# Patient Record
Sex: MALE | Race: WHITE | NOT HISPANIC OR LATINO | Employment: FULL TIME | URBAN - METROPOLITAN AREA
[De-identification: names, ages, dates, MRNs, and addresses within clinical notes are randomized per-mention and may not be internally consistent; named-entity substitution may affect disease eponyms.]

---

## 2017-01-27 ENCOUNTER — ALLSCRIPTS OFFICE VISIT (OUTPATIENT)
Dept: OTHER | Facility: OTHER | Age: 47
End: 2017-01-27

## 2017-01-27 DIAGNOSIS — E78.5 HYPERLIPIDEMIA: ICD-10-CM

## 2017-01-27 DIAGNOSIS — M25.561 PAIN IN RIGHT KNEE: ICD-10-CM

## 2017-01-27 DIAGNOSIS — M25.562 PAIN IN LEFT KNEE: ICD-10-CM

## 2018-01-14 VITALS
HEIGHT: 63 IN | WEIGHT: 169.2 LBS | OXYGEN SATURATION: 99 % | BODY MASS INDEX: 29.98 KG/M2 | TEMPERATURE: 97.7 F | SYSTOLIC BLOOD PRESSURE: 114 MMHG | DIASTOLIC BLOOD PRESSURE: 72 MMHG | HEART RATE: 98 BPM

## 2019-12-27 ENCOUNTER — APPOINTMENT (EMERGENCY)
Dept: CT IMAGING | Facility: HOSPITAL | Age: 49
End: 2019-12-27
Payer: COMMERCIAL

## 2019-12-27 ENCOUNTER — HOSPITAL ENCOUNTER (OUTPATIENT)
Facility: HOSPITAL | Age: 49
Setting detail: OBSERVATION
Discharge: HOME | End: 2019-12-29
Attending: EMERGENCY MEDICINE | Admitting: INTERNAL MEDICINE
Payer: COMMERCIAL

## 2019-12-27 ENCOUNTER — APPOINTMENT (EMERGENCY)
Dept: ULTRASOUND IMAGING | Facility: HOSPITAL | Age: 49
End: 2019-12-27
Payer: COMMERCIAL

## 2019-12-27 ENCOUNTER — APPOINTMENT (OUTPATIENT)
Dept: MRI IMAGING | Facility: HOSPITAL | Age: 49
End: 2019-12-27
Payer: COMMERCIAL

## 2019-12-27 DIAGNOSIS — T14.8XXA INTRAMUSCULAR HEMATOMA: Primary | ICD-10-CM

## 2019-12-27 DIAGNOSIS — S80.12XA HEMATOMA OF LEFT LOWER EXTREMITY: ICD-10-CM

## 2019-12-27 LAB
ALBUMIN SERPL BCP-MCNC: 3.8 G/DL (ref 3.5–5)
ALP SERPL-CCNC: 61 U/L (ref 46–116)
ALT SERPL W P-5'-P-CCNC: 28 U/L (ref 12–78)
ANION GAP SERPL CALCULATED.3IONS-SCNC: 10 MMOL/L (ref 4–13)
AST SERPL W P-5'-P-CCNC: 42 U/L (ref 5–45)
BASOPHILS # BLD AUTO: 0.02 THOUSANDS/ΜL (ref 0–0.1)
BASOPHILS NFR BLD AUTO: 0 % (ref 0–1)
BILIRUB DIRECT SERPL-MCNC: 0.12 MG/DL (ref 0–0.2)
BILIRUB SERPL-MCNC: 0.7 MG/DL (ref 0.2–1)
BUN SERPL-MCNC: 19 MG/DL (ref 5–25)
CALCIUM SERPL-MCNC: 8.4 MG/DL (ref 8.3–10.1)
CHLORIDE SERPL-SCNC: 103 MMOL/L (ref 100–108)
CO2 SERPL-SCNC: 26 MMOL/L (ref 21–32)
CREAT SERPL-MCNC: 0.74 MG/DL (ref 0.6–1.3)
EOSINOPHIL # BLD AUTO: 0.19 THOUSAND/ΜL (ref 0–0.61)
EOSINOPHIL NFR BLD AUTO: 2 % (ref 0–6)
ERYTHROCYTE [DISTWIDTH] IN BLOOD BY AUTOMATED COUNT: 12.4 % (ref 11.6–15.1)
GFR SERPL CREATININE-BSD FRML MDRD: 110 ML/MIN/1.73SQ M
GLUCOSE SERPL-MCNC: 109 MG/DL (ref 65–140)
HCT VFR BLD AUTO: 35.8 % (ref 36.5–49.3)
HGB BLD-MCNC: 12.4 G/DL (ref 12–17)
IMM GRANULOCYTES # BLD AUTO: 0.02 THOUSAND/UL (ref 0–0.2)
IMM GRANULOCYTES NFR BLD AUTO: 0 % (ref 0–2)
INR PPP: 0.92 (ref 0.84–1.19)
LYMPHOCYTES # BLD AUTO: 2.38 THOUSANDS/ΜL (ref 0.6–4.47)
LYMPHOCYTES NFR BLD AUTO: 30 % (ref 14–44)
MCH RBC QN AUTO: 31.3 PG (ref 26.8–34.3)
MCHC RBC AUTO-ENTMCNC: 34.6 G/DL (ref 31.4–37.4)
MCV RBC AUTO: 90 FL (ref 82–98)
MONOCYTES # BLD AUTO: 0.7 THOUSAND/ΜL (ref 0.17–1.22)
MONOCYTES NFR BLD AUTO: 9 % (ref 4–12)
NEUTROPHILS # BLD AUTO: 4.59 THOUSANDS/ΜL (ref 1.85–7.62)
NEUTS SEG NFR BLD AUTO: 59 % (ref 43–75)
NRBC BLD AUTO-RTO: 0 /100 WBCS
PLATELET # BLD AUTO: 270 THOUSANDS/UL (ref 149–390)
PMV BLD AUTO: 9.5 FL (ref 8.9–12.7)
POTASSIUM SERPL-SCNC: 3.6 MMOL/L (ref 3.5–5.3)
PROT SERPL-MCNC: 6.9 G/DL (ref 6.4–8.2)
PROTHROMBIN TIME: 12.4 SECONDS (ref 11.6–14.5)
RBC # BLD AUTO: 3.96 MILLION/UL (ref 3.88–5.62)
SODIUM SERPL-SCNC: 139 MMOL/L (ref 136–145)
WBC # BLD AUTO: 7.9 THOUSAND/UL (ref 4.31–10.16)

## 2019-12-27 PROCEDURE — 99285 EMERGENCY DEPT VISIT HI MDM: CPT | Performed by: EMERGENCY MEDICINE

## 2019-12-27 PROCEDURE — 36415 COLL VENOUS BLD VENIPUNCTURE: CPT | Performed by: EMERGENCY MEDICINE

## 2019-12-27 PROCEDURE — 93971 EXTREMITY STUDY: CPT | Performed by: SURGERY

## 2019-12-27 PROCEDURE — 73701 CT LOWER EXTREMITY W/DYE: CPT

## 2019-12-27 PROCEDURE — 99284 EMERGENCY DEPT VISIT MOD MDM: CPT

## 2019-12-27 PROCEDURE — 80048 BASIC METABOLIC PNL TOTAL CA: CPT | Performed by: EMERGENCY MEDICINE

## 2019-12-27 PROCEDURE — 93971 EXTREMITY STUDY: CPT

## 2019-12-27 PROCEDURE — 85610 PROTHROMBIN TIME: CPT | Performed by: EMERGENCY MEDICINE

## 2019-12-27 PROCEDURE — 73718 MRI LOWER EXTREMITY W/O DYE: CPT

## 2019-12-27 PROCEDURE — 85025 COMPLETE CBC W/AUTO DIFF WBC: CPT | Performed by: EMERGENCY MEDICINE

## 2019-12-27 PROCEDURE — 80076 HEPATIC FUNCTION PANEL: CPT | Performed by: EMERGENCY MEDICINE

## 2019-12-27 PROCEDURE — 99244 OFF/OP CNSLTJ NEW/EST MOD 40: CPT | Performed by: PHYSICIAN ASSISTANT

## 2019-12-27 PROCEDURE — 99219 PR INITIAL OBSERVATION CARE/DAY 50 MINUTES: CPT | Performed by: INTERNAL MEDICINE

## 2019-12-27 RX ORDER — TRAMADOL HYDROCHLORIDE 50 MG/1
50 TABLET ORAL EVERY 6 HOURS PRN
Status: DISCONTINUED | OUTPATIENT
Start: 2019-12-27 | End: 2019-12-29 | Stop reason: HOSPADM

## 2019-12-27 RX ORDER — ACETAMINOPHEN 325 MG/1
650 TABLET ORAL EVERY 6 HOURS PRN
Status: DISCONTINUED | OUTPATIENT
Start: 2019-12-27 | End: 2019-12-29 | Stop reason: HOSPADM

## 2019-12-27 RX ORDER — OXYCODONE HYDROCHLORIDE 5 MG/1
5 TABLET ORAL EVERY 4 HOURS PRN
Status: DISCONTINUED | OUTPATIENT
Start: 2019-12-27 | End: 2019-12-29 | Stop reason: HOSPADM

## 2019-12-27 RX ADMIN — IOHEXOL 100 ML: 350 INJECTION, SOLUTION INTRAVENOUS at 11:16

## 2019-12-27 RX ADMIN — TRAMADOL HYDROCHLORIDE 50 MG: 50 TABLET, FILM COATED ORAL at 17:18

## 2019-12-27 NOTE — ED NOTES
1  CC - pt seen ED for acute lower left extremity swelling, no injury noted, pt reports feeling a "tearing sensation" and noticed swelling  VAS duplex completed, along with CT of tib/fib  Pt diagnosed with intramuscular hematoma  MRI ordered, awaiting time  Pt evaluated via ortho - ortho plan to await MRI and continue to track progress of swelling to extremity  2  Admission related to injury? - no    3  Orientation status - A&Ox4    4  Abnormal labs/abnormal focused assessment/vitals - none    5  Medications/drips - none infusing    6  Last time narcotics given - Ultram given at 1718    7  IV lines/drains/etc - 20g L AC    8  Isolation status - standard    9  Skin - intact     10  Ambulation - independent     11   ED nurse's name/# - Catherine Laura RN  12/27/19 5814

## 2019-12-27 NOTE — LETTER
[address] [place] [phone]  No information on file  December 29, 2019     Patient: Aries Mendoza   YOB: 1970   Date of Visit: 12/27/2019       To Whom it May Concern:    Allie Lopez is under my professional care  He was seen in the hospital from 12/27/2019   to 12/29/19  He may return to work on 01/07/19 with the following limitations partial weight bearing left lower extremity, use crutches until cleared by your ortho doctor  If you have any questions or concerns, please don't hesitate to call           Sincerely,          Destin Sharp MD

## 2019-12-27 NOTE — ASSESSMENT & PLAN NOTE
· Place in 23-hour observation, med/surg level of care  · Serial examinations to monitor the patient's pulses in the left lower extremity  · Serial laboratory testing to monitor the patient's hemoglobin and hematocrit levels  · Consult Orthopedic Surgery  · Check an MRI of the left tibia/fibula region      CT tibia fibula left w contrast   Status: Final result   PACS Images      Show images for CT tibia fibula left w contrast   Study Result     CT LEFT TIBIA-FIBULA WITH IV CONTRAST     INDICATION: spontaneous hemorrhage      COMPARISON: None      TECHNIQUE: CT examination of the left tibia-fibula was performed  This examination, like all CT scans performed in the [facility], was performed utilizing techniques to minimize radiation dose exposure, including the use of iterative   reconstruction and automated exposure control software  Sagittal and coronal two dimensional reconstructed images were also submitted for interpretation      IV Contrast: 100 mL of iohexol (OMNIPAQUE)     Rad dose  513 mGy-cm      FINDINGS:     OSSEOUS STRUCTURES:  No fracture, dislocation or destructive osseous lesion      VISUALIZED MUSCULATURE AND SOFT TISSUES:  There is fluid and hyperdense hematoma in the fascial plane between the distal gastrocnemius medial head and soleus muscles with edema tracking throughout the subcutaneous tissues of the posteromedial leg  The   hematoma measures approximately 7 4 x 1 1 x 8 cm (transverse by AP by proximodistal), although the margins are ill-defined  No evidence of active extravasation  No gas in the deep fascial planes  Normal three-vessel runoff to the level of the foot      OTHER PERTINENT FINDINGS:  None      IMPRESSION:     1  Intermuscular hematoma at the posteromedial aspect of the left mid leg, possibly due to a traumatic contusion versus regional muscular or tendinous injury, although no discrete injury is identified    No evidence of active arterial extravasation      2  No acute osseous abnormality         VAS lower limb venous duplex study, unilateral/limited   Status: Final result   PACS Images      Show images for VAS lower limb venous duplex study, unilateral/limited   Study Result        [facility] REPORT  CLINICAL:  Indications: Left Localized edema [R60 0]  Patient states he got a left calf  cramp yesterday and then the calf swelled and bruised  Patient denies recent  travel  Risk Factors  The patient has no history of DVT or Recent Trauma  FINDINGS:     Segment  Right            Left                Impression       Impression         CFV      Normal (Patent)  Normal (Patent)             CONCLUSION:     Impression:  RIGHT LOWER LIMB LIMITED:  Evaluation shows no evidence of thrombus in the common femoral vein  Doppler evaluation shows a normal response to augmentation maneuvers  LEFT LOWER LIMB:  No evidence of acute or chronic deep vein thrombosis  No evidence of superficial thrombophlebitis noted  Doppler evaluation shows a normal response to augmentation maneuvers  Popliteal, posterior tibial and anterior tibial arterial Doppler waveforms are  triphasic  There is intramuscular fluid seen in the left calf in the area of swelling and  bruising  Technical findings were given to Dr Lissette Hopkins at time of study       SIGNATURE:  Electronically Signed by: Mindy Denton on 2019-12-27 11:51:00 AM

## 2019-12-27 NOTE — H&P
H&P- Lorenzokristian Retort 1970, 54 y o  male MRN: [id number]    Unit/Bed#: ED 24 Encounter: [id number]    Primary Care Provider: Elizabeth Medina MD   Date and time admitted to hospital: 12/27/2019  9:24 AM        * Hematoma of left lower extremity  Assessment & Plan  · Place in 23-hour observation, med/surg level of care  · Serial examinations to monitor the patient's pulses in the left lower extremity  · Serial laboratory testing to monitor the patient's hemoglobin and hematocrit levels  · Consult Orthopedic Surgery  · Check an MRI of the left tibia/fibula region      CT tibia fibula left w contrast   Status: Final result   PACS Images      Show images for CT tibia fibula left w contrast   Study Result     CT LEFT TIBIA-FIBULA WITH IV CONTRAST     INDICATION: spontaneous hemorrhage      COMPARISON: None      TECHNIQUE: CT examination of the left tibia-fibula was performed  This examination, like all CT scans performed in the [facility], was performed utilizing techniques to minimize radiation dose exposure, including the use of iterative   reconstruction and automated exposure control software  Sagittal and coronal two dimensional reconstructed images were also submitted for interpretation      IV Contrast: 100 mL of iohexol (OMNIPAQUE)     Rad dose  513 mGy-cm      FINDINGS:     OSSEOUS STRUCTURES:  No fracture, dislocation or destructive osseous lesion      VISUALIZED MUSCULATURE AND SOFT TISSUES:  There is fluid and hyperdense hematoma in the fascial plane between the distal gastrocnemius medial head and soleus muscles with edema tracking throughout the subcutaneous tissues of the posteromedial leg  The   hematoma measures approximately 7 4 x 1 1 x 8 cm (transverse by AP by proximodistal), although the margins are ill-defined  No evidence of active extravasation  No gas in the deep fascial planes    Normal three-vessel runoff to the level of the foot      OTHER PERTINENT FINDINGS: None      IMPRESSION:     1  Intermuscular hematoma at the posteromedial aspect of the left mid leg, possibly due to a traumatic contusion versus regional muscular or tendinous injury, although no discrete injury is identified  No evidence of active arterial extravasation      2  No acute osseous abnormality         VAS lower limb venous duplex study, unilateral/limited   Status: Final result   PACS Images      Show images for VAS lower limb venous duplex study, unilateral/limited   Study Result        [facility] REPORT  CLINICAL:  Indications: Left Localized edema [R60 0]  Patient states he got a left calf  cramp yesterday and then the calf swelled and bruised  Patient denies recent  travel  Risk Factors  The patient has no history of DVT or Recent Trauma  FINDINGS:     Segment  Right            Left                Impression       Impression         CFV      Normal (Patent)  Normal (Patent)             CONCLUSION:     Impression:  RIGHT LOWER LIMB LIMITED:  Evaluation shows no evidence of thrombus in the common femoral vein  Doppler evaluation shows a normal response to augmentation maneuvers  LEFT LOWER LIMB:  No evidence of acute or chronic deep vein thrombosis  No evidence of superficial thrombophlebitis noted  Doppler evaluation shows a normal response to augmentation maneuvers  Popliteal, posterior tibial and anterior tibial arterial Doppler waveforms are  triphasic  There is intramuscular fluid seen in the left calf in the area of swelling and  bruising  Technical findings were given to Dr Anthony Cha at time of study  SIGNATURE:  Electronically Signed by: Kellie Bagley on 2019-12-27 11:51:00 AM           VTE Prophylaxis: Pharmacologic VTE Prophylaxis contraindicated due to Intermuscular hematoma of the left lower extremity  / sequential compression device on the right lower extremity only    No SCD on the left lower extremity with an intermuscular hematoma of the left lower extremity  Code Status: Level 1-Full Code    Anticipated Length of Stay:  Patient will be admitted on an Observation basis with an anticipated length of stay of less than 2 midnights  Justification for Hospital Stay:  The patient requires an MRI of the left tibia/fibula region, an Orthopedic Surgery consultation, serial examinations of the pulses of the left lower extremity, and serial laboratory testing to monitor the hemoglobin and hematocrit levels  Chief Complaint:  Pain and ecchymosis of the left lower extremity    History of Present Illness:    Elisabeth Carney is a 54 y o  male who presents to emergency department with the complaint of pain and ecchymosis of the left lower extremity  Over the last week, the patient has been snowboarding as well as mountain biking, but he did not sustain any injury that he was aware of experiencing  The patient was walking on Thursday, 12/26/2019, and developed severe left calf pain  The patient then developed ecchymosis and edema involving the left calf region  The patient is having difficulty with ambulation secondary to the left calf pain  The patient denies any trauma to the left lower extremity  No chest pain  No shortness of breath  No fevers or chills  Review of Systems:    Review of Systems:  Per HPI, all other systems have been reviewed and were negative  Past Medical and Surgical History:     Past Medical History:   Diagnosis Date    Seasonal allergies        History reviewed  No pertinent surgical history  Meds/Allergies:    Prior to Admission medications    Not on File     I have reviewed home medications with patient personally      Allergies: No Known Allergies    Social History:     Marital Status: /Civil Union     Substance Use History:   Social History     Substance and Sexual Activity   Alcohol Use Yes    Comment: socially     Social History     Tobacco Use   Smoking Status Never Smoker   Smokeless Tobacco Never Used Social History     Substance and Sexual Activity   Drug Use Never       Family History:    non-contributory    Physical Exam:     Vitals:   Blood Pressure: 138/63 (12/27/19 1500)  Pulse: 77 (12/27/19 1500)  Temperature: 97 9 °F (36 6 °C) (12/27/19 1142)  Temp Source: Oral (12/27/19 1142)  Respirations: 20 (12/27/19 1500)  Height: 5' 3" (160 cm) (12/27/19 0926)  Weight - Scale: 82 5 kg (181 lb 14 1 oz) (12/27/19 0926)  SpO2: 96 % (12/27/19 1500)    Physical Exam  General:  NAD, awake, alert, follows commands  HEENT:  NC/AT, mucous membranes moist  Neck:  Supple, No JVP elevation  CV:  + S1, + S2, RRR  Pulm:  Lung fields are CTA bilaterally  Abd:  Soft, Non-tender, Non-distended  Ext:  No clubbing/cyanosis, + Edema of the left calf region  Skin:  + Ecchymosis of the left calf region and streaking erythema of the left anterior shin region      Additional Data:     Lab Results: I have personally reviewed pertinent reports  Results from last 7 days   Lab Units 12/27/19  1028   WBC Thousand/uL 7 90   HEMOGLOBIN g/dL 12 4   HEMATOCRIT % 35 8*   PLATELETS Thousands/uL 270   NEUTROS PCT % 59   LYMPHS PCT % 30   MONOS PCT % 9   EOS PCT % 2     Results from last 7 days   Lab Units 12/27/19  1028   SODIUM mmol/L 139   POTASSIUM mmol/L 3 6   CHLORIDE mmol/L 103   CO2 mmol/L 26   BUN mg/dL 19   CREATININE mg/dL 0 74   ANION GAP mmol/L 10   CALCIUM mg/dL 8 4   ALBUMIN g/dL 3 8   TOTAL BILIRUBIN mg/dL 0 70   ALK PHOS U/L 61   ALT U/L 28   AST U/L 42   GLUCOSE RANDOM mg/dL 109     Results from last 7 days   Lab Units 12/27/19  1028   INR  0 92                   Imaging: I have personally reviewed pertinent reports  CT tibia fibula left w contrast   Final Result by Shayy Arcos MD (12/27 1205)      1  Intermuscular hematoma at the posteromedial aspect of the left mid leg, possibly due to a traumatic contusion versus regional muscular or tendinous injury, although no discrete injury is identified    No evidence of active arterial extravasation  2   No acute osseous abnormality  Workstation performed: [id number]         VAS lower limb venous duplex study, unilateral/limited   Final Result by Yovani Holt DO (12/27 1151)      MRI inpatient order    (Results Pending)       Allscripts / Epic Records Reviewed: Yes     ** Please Note: This note has been constructed using a voice recognition system   **

## 2019-12-27 NOTE — ED PROVIDER NOTES
History  Chief Complaint   Patient presents with    Leg Swelling     pt c/o left calf swelling  denies any acute injury/strain     54year old male pt come into the ED with left lower leg swelling  There is significant ecchymosis and discoloration the leg but normal neurologic exam the foot  Patient is not on blood thinners but just some concern that the patient is hemorrhaging into the leg, if not the patient likely has a DVT  There is a significant difference in the exam between the patient's left leg and right leg  The patient states the longest period of travel he has had was from here to [facility] approximately 45 minutes  I instructed the patient that if he has any discomfort in the foot to notify us immediately but currently he has a normal neurologic exam and states no pain or discomfort in the foot when  Not walking  History provided by:  Patient   used: No    Leg Pain   Location:  Leg  Leg location:  L leg  Pain details:     Quality:  Aching    Radiates to:  Does not radiate    Severity:  Mild    Onset quality:  Gradual    Timing:  Constant    Progression:  Worsening  Chronicity:  New  Relieved by:  Nothing  Worsened by:  Nothing  Ineffective treatments:  None tried  Associated symptoms: decreased ROM and stiffness    Associated symptoms: no tingling    Risk factors: no concern for non-accidental trauma and no obesity        None       Past Medical History:   Diagnosis Date    Seasonal allergies        History reviewed  No pertinent surgical history  History reviewed  No pertinent family history  I have reviewed and agree with the history as documented  Social History     Tobacco Use    Smoking status: Never Smoker    Smokeless tobacco: Never Used   Substance Use Topics    Alcohol use: Yes     Frequency: Monthly or less     Comment: socially    Drug use: Never        Review of Systems   Musculoskeletal: Positive for stiffness     All other systems reviewed and are negative  Physical Exam  Physical Exam   Constitutional: He is oriented to person, place, and time  He appears well-developed and well-nourished  No distress  HENT:   Head: Normocephalic and atraumatic  Right Ear: External ear normal    Left Ear: External ear normal    Eyes: Conjunctivae and EOM are normal  Right eye exhibits no discharge  Left eye exhibits no discharge  No scleral icterus  Neck: Normal range of motion  Neck supple  No JVD present  No tracheal deviation present  No thyromegaly present  Cardiovascular: Normal rate and regular rhythm  Pulmonary/Chest: Effort normal and breath sounds normal  No stridor  No respiratory distress  He has no wheezes  He has no rales  Abdominal: Soft  Bowel sounds are normal  He exhibits no distension  There is no tenderness  Musculoskeletal: Normal range of motion  He exhibits no edema, tenderness or deformity  Neurological: He is alert and oriented to person, place, and time  No cranial nerve deficit  Coordination normal    Skin: Skin is warm and dry  He is not diaphoretic  Psychiatric: He has a normal mood and affect  His behavior is normal    Nursing note and vitals reviewed        Vital Signs  ED Triage Vitals [12/27/19 0926]   Temperature Pulse Respirations Blood Pressure SpO2   97 6 °F (36 4 °C) 75 16 134/82 95 %      Temp Source Heart Rate Source Patient Position - Orthostatic VS BP Location FiO2 (%)   Oral Monitor Sitting Right arm --      Pain Score       6           Vitals:    12/27/19 1824 12/27/19 1941 12/27/19 2328 12/28/19 0809   BP: 118/81 121/80 109/63 122/76   Pulse: 65 75 68 68   Patient Position - Orthostatic VS:             Visual Acuity      ED Medications  Medications   acetaminophen (TYLENOL) tablet 650 mg (650 mg Oral Given 12/28/19 0856)   traMADol (ULTRAM) tablet 50 mg (50 mg Oral Given 12/28/19 0539)     Or   oxyCODONE (ROXICODONE) IR tablet 5 mg ( Oral See Alternative 12/28/19 0539)   sodium chloride 0 9 % infusion (has no administration in time range)   iohexol (OMNIPAQUE) 350 MG/ML injection (MULTI-DOSE) 100 mL (100 mL Intravenous Given 12/27/19 1116)       Diagnostic Studies  Results Reviewed     Procedure Component Value Units Date/Time    CK [[id number]]  (Abnormal) Collected:  12/28/19 0545    Lab Status:  Final result Specimen:  Blood from Arm, Left Updated:  12/28/19 0709     Total CK 1,563 U/L     Comprehensive metabolic panel [[id number]]  (Abnormal) Collected:  12/28/19 0545    Lab Status:  Final result Specimen:  Blood from Arm, Left Updated:  12/28/19 0709     Sodium 138 mmol/L      Potassium 4 2 mmol/L      Chloride 103 mmol/L      CO2 26 mmol/L      ANION GAP 9 mmol/L      BUN 17 mg/dL      Creatinine 0 66 mg/dL      Glucose 104 mg/dL      Glucose, Fasting 104 mg/dL      Calcium 8 1 mg/dL      AST 34 U/L      ALT 35 U/L      Alkaline Phosphatase 53 U/L      Total Protein 6 7 g/dL      Albumin 3 6 g/dL      Total Bilirubin 0 60 mg/dL      eGFR 115 ml/min/1 73sq m     Narrative:       [place] guidelines for Chronic Kidney Disease (CKD):     Stage 1 with normal or high GFR (GFR > 90 mL/min/1 73 square meters)    Stage 2 Mild CKD (GFR = 60-89 mL/min/1 73 square meters)    Stage 3A Moderate CKD (GFR = 45-59 mL/min/1 73 square meters)    Stage 3B Moderate CKD (GFR = 30-44 mL/min/1 73 square meters)    Stage 4 Severe CKD (GFR = 15-29 mL/min/1 73 square meters)    Stage 5 End Stage CKD (GFR <15 mL/min/1 73 square meters)  Note: GFR calculation is accurate only with a steady state creatinine    Magnesium [[id number]]  (Normal) Collected:  12/28/19 0545    Lab Status:  Final result Specimen:  Blood from Arm, Left Updated:  12/28/19 0709     Magnesium 2 1 mg/dL     Phosphorus [[id number]]  (Normal) Collected:  12/28/19 0545    Lab Status:  Final result Specimen:  Blood from Arm, Left Updated:  12/28/19 0709     Phosphorus 3 8 mg/dL     Lactic acid, plasma [[id number]]  (Normal) Collected:  12/28/19 2323    Lab Status:  Final result Specimen:  Blood from Arm, Left Updated:  12/28/19 1736     LACTIC ACID 1 1 mmol/L     Narrative:       Result may be elevated if tourniquet was used during collection  CBC and differential [[id number]] Collected:  12/28/19 0545    Lab Status:  Final result Specimen:  Blood from Arm, Left Updated:  12/28/19 0559     WBC 6 45 Thousand/uL      RBC 4 01 Million/uL      Hemoglobin 12 6 g/dL      Hematocrit 36 7 %      MCV 92 fL      MCH 31 4 pg      MCHC 34 3 g/dL      RDW 12 4 %      MPV 9 5 fL      Platelets 919 Thousands/uL      nRBC 0 /100 WBCs      Neutrophils Relative 50 %      Immat GRANS % 1 %      Lymphocytes Relative 36 %      Monocytes Relative 9 %      Eosinophils Relative 4 %      Basophils Relative 0 %      Neutrophils Absolute 3 21 Thousands/µL      Immature Grans Absolute 0 03 Thousand/uL      Lymphocytes Absolute 2 32 Thousands/µL      Monocytes Absolute 0 59 Thousand/µL      Eosinophils Absolute 0 28 Thousand/µL      Basophils Absolute 0 02 Thousands/µL     Hemoglobin A1C [[id number]] Collected:  12/28/19 0545    Lab Status:   In process Specimen:  Blood from Arm, Left Updated:  12/28/19 0553    Procalcitonin [[id number]]     Lab Status:  No result Specimen:  Blood     CBC and differential [[id number]]  (Abnormal) Collected:  12/27/19 1028    Lab Status:  Final result Specimen:  Blood from Arm, Right Updated:  12/27/19 1057     WBC 7 90 Thousand/uL      RBC 3 96 Million/uL      Hemoglobin 12 4 g/dL      Hematocrit 35 8 %      MCV 90 fL      MCH 31 3 pg      MCHC 34 6 g/dL      RDW 12 4 %      MPV 9 5 fL      Platelets 325 Thousands/uL      nRBC 0 /100 WBCs      Neutrophils Relative 59 %      Immat GRANS % 0 %      Lymphocytes Relative 30 %      Monocytes Relative 9 %      Eosinophils Relative 2 %      Basophils Relative 0 %      Neutrophils Absolute 4 59 Thousands/µL      Immature Grans Absolute 0 02 Thousand/uL      Lymphocytes Absolute 2 38 Thousands/µL      Monocytes Absolute 0 70 Thousand/µL      Eosinophils Absolute 0 19 Thousand/µL      Basophils Absolute 0 02 Thousands/µL     Basic metabolic panel [[id number]] Collected:  12/27/19 1028    Lab Status:  Final result Specimen:  Blood from Arm, Right Updated:  12/27/19 1049     Sodium 139 mmol/L      Potassium 3 6 mmol/L      Chloride 103 mmol/L      CO2 26 mmol/L      ANION GAP 10 mmol/L      BUN 19 mg/dL      Creatinine 0 74 mg/dL      Glucose 109 mg/dL      Calcium 8 4 mg/dL      eGFR 110 ml/min/1 73sq m     Narrative:       [place] guidelines for Chronic Kidney Disease (CKD):     Stage 1 with normal or high GFR (GFR > 90 mL/min/1 73 square meters)    Stage 2 Mild CKD (GFR = 60-89 mL/min/1 73 square meters)    Stage 3A Moderate CKD (GFR = 45-59 mL/min/1 73 square meters)    Stage 3B Moderate CKD (GFR = 30-44 mL/min/1 73 square meters)    Stage 4 Severe CKD (GFR = 15-29 mL/min/1 73 square meters)    Stage 5 End Stage CKD (GFR <15 mL/min/1 73 square meters)  Note: GFR calculation is accurate only with a steady state creatinine    Hepatic function panel [[id number]]  (Normal) Collected:  12/27/19 1028    Lab Status:  Final result Specimen:  Blood from Arm, Right Updated:  12/27/19 1049     Total Bilirubin 0 70 mg/dL      Bilirubin, Direct 0 12 mg/dL      Alkaline Phosphatase 61 U/L      AST 42 U/L      ALT 28 U/L      Total Protein 6 9 g/dL      Albumin 3 8 g/dL     Protime-INR [[id number]]  (Normal) Collected:  12/27/19 1028    Lab Status:  Final result Specimen:  Blood from Arm, Right Updated:  12/27/19 1043     Protime 12 4 seconds      INR 0 92                 CT tibia fibula left w contrast   Final Result by Gordo Ricks MD (12/27 1205)      1  Intermuscular hematoma at the posteromedial aspect of the left mid leg, possibly due to a traumatic contusion versus regional muscular or tendinous injury, although no discrete injury is identified  No evidence of active arterial extravasation  2   No acute osseous abnormality  Workstation performed: [id number]         VAS lower limb venous duplex study, unilateral/limited   Final Result by Kat Sanchez DO (12/27 1151)      MRI tibia fibula left wo contrast    (Results Pending)              Procedures  Procedures         ED Course                               MDM  Number of Diagnoses or Management Options  Intramuscular hematoma: new and requires workup     Amount and/or Complexity of Data Reviewed  Clinical lab tests: ordered and reviewed  Tests in the radiology section of CPT®: reviewed and ordered  Decide to obtain previous medical records or to obtain history from someone other than the patient: yes  Review and summarize past medical records: yes    Patient Progress  Patient progress: stable        Disposition  Final diagnoses:   Intramuscular hematoma     Time reflects when diagnosis was documented in both MDM as applicable and the Disposition within this note     Time User Action Codes Description Comment    12/27/2019 12:18 PM Katherine Ortiz Add Ne Short  8XXA] Intramuscular hematoma     12/27/2019  1:17 PM Marisabel Calixto Add [S80 12XA] Hematoma of left lower extremity       ED Disposition     ED Disposition Condition Date/Time Comment    Admit Stable Fri Dec 27, 2019 12:18 PM Case was discussed with Dr Dat Thomas and the patient's admission status was agreed to be Admission Status: OBS status to the service of Dr Dat Thomas   Follow-up Information    None         There are no discharge medications for this patient  No discharge procedures on file      ED Provider  Electronically Signed by           Mckayla Michaels DO  12/28/19 1012

## 2019-12-27 NOTE — CONSULTS
Orthopedics   Madeleine Macdonald 54 y o  male MRN: [id number]  Unit/Bed#: MO CT SCAN      Chief Complaint:   Left calf pain    HPI:  54 y o  male complaining of left calf pain  Patient states he had no specific injury to the calf  He denies being on any blood thinners  He was out for a walk yesterday when he thought he had a cramp in his left calf  Pain was mild and no ecchymosis was noted  By the end of the night he noticed increased swelling with increased pain and just mild ecchymosis  When patient woke up this morning he noticed the entire calf was ecchymotic and his left lower leg was bigger than his right lower leg  Patient had difficulty ambulating secondary to pain  Dorsiflexion of the ankle with increased pain in the left calf which made walking difficult  They decided to go to the ER to rule out blood clot  Patient came to [facility] Emergency Room and was admitted  Doppler study was negative for DVT and CT scan did show hematoma  Patient states he feels it is slightly better since he has been at the hospital   He feels there is slightly less swelling  He did have mild tingling over his toes when he 1st came to the hospital but that has since resolved  He currently denies any numbness or tingling lower extremity  Review Of Systems:   · Skin:   · Neuro: See HPI  · Musculoskeletal: See HPI  · 14 point review of systems negative except as stated above     Past Medical History:   Past Medical History:   Diagnosis Date    Seasonal allergies        Past Surgical History:   History reviewed  No pertinent surgical history  Family History:  Family history reviewed and non-contributory  History reviewed  No pertinent family history      Social History:  Social History     Socioeconomic History    Marital status: /Civil Union     Spouse name: None    Number of children: None    Years of education: None    Highest education level: None   Occupational History    None   Social Needs    Financial resource strain: None    Food insecurity:     Worry: None     Inability: None    Transportation needs:     Medical: None     Non-medical: None   Tobacco Use    Smoking status: Never Smoker    Smokeless tobacco: Never Used   Substance and Sexual Activity    Alcohol use: Yes     Comment: socially    Drug use: Never    Sexual activity: None   Lifestyle    Physical activity:     Days per week: None     Minutes per session: None    Stress: None   Relationships    Social connections:     Talks on phone: None     Gets together: None     Attends Islam service: None     Active member of club or organization: None     Attends meetings of clubs or organizations: None     Relationship status: None    Intimate partner violence:     Fear of current or ex partner: None     Emotionally abused: None     Physically abused: None     Forced sexual activity: None   Other Topics Concern    None   Social History Narrative    None       Allergies:   No Known Allergies        Labs:  0   Lab Value Date/Time    HCT 35 8 (L) 12/27/2019 1028    HGB 12 4 12/27/2019 1028    INR 0 92 12/27/2019 1028    WBC 7 90 12/27/2019 1028       Meds:    Current Facility-Administered Medications:     acetaminophen (TYLENOL) tablet 650 mg, 650 mg, Oral, Q6H PRN, Abimaelss Yohan, DO    traMADol (ULTRAM) tablet 50 mg, 50 mg, Oral, Q6H PRN **OR** oxyCODONE (ROXICODONE) IR tablet 5 mg, 5 mg, Oral, Q4H PRN, Nura Almanzar, DO  No current outpatient medications on file  Blood Culture:   No results found for: BLOODCX    Wound Culture:   No results found for: WOUNDCULT    Ins and Outs:  No intake/output data recorded            Physical Exam:   /63 (BP Location: Right arm)   Pulse 77   Temp 97 9 °F (36 6 °C) (Oral)   Resp 20   Ht 5' 3" (1 6 m)   Wt 82 5 kg (181 lb 14 1 oz)   SpO2 96%   BMI 32 22 kg/m²   Gen: Alert and oriented to person, place, time  HEENT: EOMI, eyes clear, moist mucus membranes, hearing intact  Respiratory: Bilateral chest rise  No audible wheezing found  Cardiovascular: Regular Rate and Rhythm  Abdomen: soft nontender/nondistended  Musculoskeletal: left lower extremity  · Skin intact  Diffuse ecchymosis noted over the entire calf from posterior knee down to his Achilles  Mildly firm to touch secondary to swelling  Painful with palpation over the calf  · Diffuse longitudinal red streaks over the anterior aspect of the tibia with no tenderness  · He is able to flex and extend his knee  · Able to dorsiflex his ankle and plantar flex to neutral and pain beyond that  · Good capillary refill toes      Tertiary: no tenderness over all other joints/long bones as except already stated  Radiology:   I personally reviewed the films  Doppler study ordered December 27, 2019 no evidence of will deep vein thrombosis bilaterally  There is intramuscular fluid seen in the left calf in the area of swelling and bruising    CT scan dated December 27, 2019 of the left lower leg shows hematoma in the fascial plane between the distal gastrocnemius muscle and soleus muscle with edema tracking throughout the subcutaneous tissues of the posterior medial leg  The hematoma measures 7 4 x 1 1 x 8 cm     _*_*_*_*_*_*_*_*_*_*_*_*_*_*_*_*_*_*_*_*_*_*_*_*_*_*_*_*_*_*_*_*_*_*_*_*_*_*_*_*_*    Assessment:  49 y o male with left calf hematoma  Patient with no history of injury and is not on any blood thinners  Patient not exhibiting any signs of compartment syndrome and Doppler study was negative for DVT  At this point we will continue to watch to see if hematoma resolves  If patient continues to bleed with the hematoma getting larger and patient becomes neurovascular compromised, he may need a fasciotomy  Will continue to keep a close eye on patient and his neurovascular status    This should resolve on its own and not need any orthopedic intervention    Plan:   · WBAT left lower extremity  · PT/OT evaluate and treat  · Pain control per primary team  · DVT ppx per primary team  · Orthopedics will keep a close eye on progress  SLIM also ordered MRI    Will evaluate MRI findings once completed and go over results with patient      Thelma Randall PA-C

## 2019-12-28 LAB
ALBUMIN SERPL BCP-MCNC: 3.6 G/DL (ref 3.5–5)
ALP SERPL-CCNC: 53 U/L (ref 46–116)
ALT SERPL W P-5'-P-CCNC: 35 U/L (ref 12–78)
ANION GAP SERPL CALCULATED.3IONS-SCNC: 9 MMOL/L (ref 4–13)
AST SERPL W P-5'-P-CCNC: 34 U/L (ref 5–45)
BASOPHILS # BLD AUTO: 0.02 THOUSANDS/ΜL (ref 0–0.1)
BASOPHILS NFR BLD AUTO: 0 % (ref 0–1)
BILIRUB SERPL-MCNC: 0.6 MG/DL (ref 0.2–1)
BUN SERPL-MCNC: 17 MG/DL (ref 5–25)
CALCIUM SERPL-MCNC: 8.1 MG/DL (ref 8.3–10.1)
CHLORIDE SERPL-SCNC: 103 MMOL/L (ref 100–108)
CK MB SERPL-MCNC: 2.2 NG/ML (ref 0–5)
CK MB SERPL-MCNC: <1 % (ref 0–2.5)
CK SERPL-CCNC: 1563 U/L (ref 39–308)
CO2 SERPL-SCNC: 26 MMOL/L (ref 21–32)
CREAT SERPL-MCNC: 0.66 MG/DL (ref 0.6–1.3)
EOSINOPHIL # BLD AUTO: 0.28 THOUSAND/ΜL (ref 0–0.61)
EOSINOPHIL NFR BLD AUTO: 4 % (ref 0–6)
ERYTHROCYTE [DISTWIDTH] IN BLOOD BY AUTOMATED COUNT: 12.4 % (ref 11.6–15.1)
EST. AVERAGE GLUCOSE BLD GHB EST-MCNC: 91 MG/DL
GFR SERPL CREATININE-BSD FRML MDRD: 115 ML/MIN/1.73SQ M
GLUCOSE P FAST SERPL-MCNC: 104 MG/DL (ref 65–99)
GLUCOSE SERPL-MCNC: 104 MG/DL (ref 65–140)
HBA1C MFR BLD: 4.8 % (ref 4.2–6.3)
HCT VFR BLD AUTO: 36.7 % (ref 36.5–49.3)
HGB BLD-MCNC: 12.6 G/DL (ref 12–17)
IMM GRANULOCYTES # BLD AUTO: 0.03 THOUSAND/UL (ref 0–0.2)
IMM GRANULOCYTES NFR BLD AUTO: 1 % (ref 0–2)
LACTATE SERPL-SCNC: 1.1 MMOL/L (ref 0.5–2)
LYMPHOCYTES # BLD AUTO: 2.32 THOUSANDS/ΜL (ref 0.6–4.47)
LYMPHOCYTES NFR BLD AUTO: 36 % (ref 14–44)
MAGNESIUM SERPL-MCNC: 2.1 MG/DL (ref 1.6–2.6)
MCH RBC QN AUTO: 31.4 PG (ref 26.8–34.3)
MCHC RBC AUTO-ENTMCNC: 34.3 G/DL (ref 31.4–37.4)
MCV RBC AUTO: 92 FL (ref 82–98)
MONOCYTES # BLD AUTO: 0.59 THOUSAND/ΜL (ref 0.17–1.22)
MONOCYTES NFR BLD AUTO: 9 % (ref 4–12)
NEUTROPHILS # BLD AUTO: 3.21 THOUSANDS/ΜL (ref 1.85–7.62)
NEUTS SEG NFR BLD AUTO: 50 % (ref 43–75)
NRBC BLD AUTO-RTO: 0 /100 WBCS
PHOSPHATE SERPL-MCNC: 3.8 MG/DL (ref 2.7–4.5)
PLATELET # BLD AUTO: 261 THOUSANDS/UL (ref 149–390)
PMV BLD AUTO: 9.5 FL (ref 8.9–12.7)
POTASSIUM SERPL-SCNC: 4.2 MMOL/L (ref 3.5–5.3)
PROT SERPL-MCNC: 6.7 G/DL (ref 6.4–8.2)
RBC # BLD AUTO: 4.01 MILLION/UL (ref 3.88–5.62)
SODIUM SERPL-SCNC: 138 MMOL/L (ref 136–145)
WBC # BLD AUTO: 6.45 THOUSAND/UL (ref 4.31–10.16)

## 2019-12-28 PROCEDURE — 82553 CREATINE MB FRACTION: CPT | Performed by: INTERNAL MEDICINE

## 2019-12-28 PROCEDURE — 84100 ASSAY OF PHOSPHORUS: CPT | Performed by: INTERNAL MEDICINE

## 2019-12-28 PROCEDURE — 85025 COMPLETE CBC W/AUTO DIFF WBC: CPT | Performed by: INTERNAL MEDICINE

## 2019-12-28 PROCEDURE — 99213 OFFICE O/P EST LOW 20 MIN: CPT | Performed by: ORTHOPAEDIC SURGERY

## 2019-12-28 PROCEDURE — 83036 HEMOGLOBIN GLYCOSYLATED A1C: CPT | Performed by: INTERNAL MEDICINE

## 2019-12-28 PROCEDURE — 99232 SBSQ HOSP IP/OBS MODERATE 35: CPT | Performed by: INTERNAL MEDICINE

## 2019-12-28 PROCEDURE — 80053 COMPREHEN METABOLIC PANEL: CPT | Performed by: INTERNAL MEDICINE

## 2019-12-28 PROCEDURE — 83605 ASSAY OF LACTIC ACID: CPT | Performed by: INTERNAL MEDICINE

## 2019-12-28 PROCEDURE — 82550 ASSAY OF CK (CPK): CPT | Performed by: INTERNAL MEDICINE

## 2019-12-28 PROCEDURE — 84145 PROCALCITONIN (PCT): CPT | Performed by: INTERNAL MEDICINE

## 2019-12-28 PROCEDURE — 83735 ASSAY OF MAGNESIUM: CPT | Performed by: INTERNAL MEDICINE

## 2019-12-28 RX ORDER — SODIUM CHLORIDE 9 MG/ML
75 INJECTION, SOLUTION INTRAVENOUS CONTINUOUS
Status: DISCONTINUED | OUTPATIENT
Start: 2019-12-28 | End: 2019-12-29 | Stop reason: HOSPADM

## 2019-12-28 RX ADMIN — TRAMADOL HYDROCHLORIDE 50 MG: 50 TABLET, FILM COATED ORAL at 05:39

## 2019-12-28 RX ADMIN — TRAMADOL HYDROCHLORIDE 50 MG: 50 TABLET, FILM COATED ORAL at 20:57

## 2019-12-28 RX ADMIN — ACETAMINOPHEN 650 MG: 325 TABLET, FILM COATED ORAL at 08:56

## 2019-12-28 RX ADMIN — SODIUM CHLORIDE 75 ML/HR: 0.9 INJECTION, SOLUTION INTRAVENOUS at 12:41

## 2019-12-28 RX ADMIN — ACETAMINOPHEN 650 MG: 325 TABLET, FILM COATED ORAL at 16:05

## 2019-12-28 NOTE — PLAN OF CARE
Problem: SKIN/TISSUE INTEGRITY - ADULT  Goal: Skin integrity remains intact  Description  INTERVENTIONS  - Identify patients at risk for skin breakdown  - Assess and monitor skin integrity  - Assess and monitor nutrition and hydration status  - Monitor labs (i e  albumin)  - Assess for incontinence   - Turn and reposition patient  - Assist with mobility/ambulation  - Relieve pressure over bony prominences  - Avoid friction and shearing  - Provide appropriate hygiene as needed including keeping skin clean and dry  - Evaluate need for skin moisturizer/barrier cream  - Collaborate with interdisciplinary team (i e  Nutrition, Rehabilitation, etc )   - Patient/family teaching  12/28/2019 1551 by Maicol Ozuna RN  Outcome: Progressing  12/28/2019 1550 by Maicol Ozuna RN  Outcome: Progressing  Goal: Incision(s), wounds(s) or drain site(s) healing without S/S of infection  Description  INTERVENTIONS  - Assess and document risk factors for skin impairment   - Assess and document dressing, incision, wound bed, drain sites and surrounding tissue  - Consider nutrition services referral as needed  - Oral mucous membranes remain intact  - Provide patient/ family education  12/28/2019 1551 by Maicol Ozuna RN  Outcome: Progressing  12/28/2019 1550 by Maicol Ozuna RN  Outcome: Progressing     Problem: PAIN - ADULT  Goal: Verbalizes/displays adequate comfort level or baseline comfort level  Description  Interventions:  - Encourage patient to monitor pain and request assistance  - Assess pain using appropriate pain scale  - Administer analgesics based on type and severity of pain and evaluate response  - Implement non-pharmacological measures as appropriate and evaluate response  - Consider cultural and social influences on pain and pain management  - Notify physician/advanced practitioner if interventions unsuccessful or patient reports new pain  12/28/2019 1551 by Maicol Ozuna RN  Outcome: Progressing  12/28/2019 1550 by Minh Levi ANOOP Kahn  Outcome: Progressing     Problem: INFECTION - ADULT  Goal: Absence or prevention of progression during hospitalization  Description  INTERVENTIONS:  - Assess and monitor for signs and symptoms of infection  - Monitor lab/diagnostic results  - Monitor all insertion sites, i e  indwelling lines, tubes, and drains  - Monitor endotracheal if appropriate and nasal secretions for changes in amount and color  - [place] appropriate cooling/warming therapies per order  - Administer medications as ordered  - Instruct and encourage patient and family to use good hand hygiene technique  - Identify and instruct in appropriate isolation precautions for identified infection/condition  12/28/2019 1551 by Jose Luis Crowley RN  Outcome: Progressing  12/28/2019 1550 by Jose Luis Crowley RN  Outcome: Progressing     Problem: SAFETY ADULT  Goal: Patient will remain free of falls  Description  INTERVENTIONS:  - Assess patient frequently for physical needs  -  Identify cognitive and physical deficits and behaviors that affect risk of falls    -  [place] fall precautions as indicated by assessment   - Educate patient/family on patient safety including physical limitations  - Instruct patient to call for assistance with activity based on assessment  - Modify environment to reduce risk of injury  - Consider OT/PT consult to assist with strengthening/mobility  12/28/2019 1551 by Jose Luis Crowley RN  Outcome: Progressing  12/28/2019 1550 by Jose Luis Crowley RN  Outcome: Progressing     Problem: DISCHARGE PLANNING  Goal: Discharge to home or other facility with appropriate resources  Description  INTERVENTIONS:  - Identify barriers to discharge w/patient and caregiver  - Arrange for needed discharge resources and transportation as appropriate  - Identify discharge learning needs (meds, wound care, etc )  - Arrange for interpretive services to assist at discharge as needed  - Refer to Case Management Department for coordinating discharge planning if the patient needs post-hospital services based on physician/advanced practitioner order or complex needs related to functional status, cognitive ability, or social support system  12/28/2019 1551 by Nel Montes RN  Outcome: Progressing  12/28/2019 1550 by Nel Montes RN  Outcome: Progressing     Problem: Knowledge Deficit  Goal: Patient/family/caregiver demonstrates understanding of disease process, treatment plan, medications, and discharge instructions  Description  Complete learning assessment and assess knowledge base  Interventions:  - Provide teaching at level of understanding  - Provide teaching via preferred learning methods  12/28/2019 1551 by Nel Montes RN  Outcome: Progressing  12/28/2019 1550 by Nel Montes RN  Outcome: Progressing     Problem: Potential for Falls  Goal: Patient will remain free of falls  Description  INTERVENTIONS:  - Assess patient frequently for physical needs  -  Identify cognitive and physical deficits and behaviors that affect risk of falls    -  [place] fall precautions as indicated by assessment   - Educate patient/family on patient safety including physical limitations  - Instruct patient to call for assistance with activity based on assessment  - Modify environment to reduce risk of injury  - Consider OT/PT consult to assist with strengthening/mobility  12/28/2019 1551 by Nel Montes RN  Outcome: Progressing  12/28/2019 1550 by Nel Montes RN  Outcome: Progressing     Problem: MUSCULOSKELETAL - ADULT  Goal: Maintain or return mobility to safest level of function  Description  INTERVENTIONS:  - Assess patient's ability to carry out ADLs; assess patient's baseline for ADL function and identify physical deficits which impact ability to perform ADLs (bathing, care of mouth/teeth, toileting, grooming, dressing, etc )  - Assess/evaluate cause of self-care deficits   - Assess range of motion  - Assess patient's mobility  - Assess patient's need for assistive devices and provide as appropriate  - Encourage maximum independence but intervene and supervise when necessary  - Involve family in performance of ADLs  - Assess for home care needs following discharge   - Consider OT consult to assist with ADL evaluation and planning for discharge  - Provide patient education as appropriate  12/28/2019 1551 by Eh Hoffman RN  Outcome: Progressing  12/28/2019 1550 by Eh Hoffman RN  Outcome: Progressing

## 2019-12-28 NOTE — NURSING NOTE
Patient instructed in partial WB LLE  Will use walker until PT crutch training  PT notified  Ice packs and elevation while in bed/ chair

## 2019-12-28 NOTE — PLAN OF CARE
Problem: SKIN/TISSUE INTEGRITY - ADULT  Goal: Skin integrity remains intact  Description  INTERVENTIONS  - Identify patients at risk for skin breakdown  - Assess and monitor skin integrity  - Assess and monitor nutrition and hydration status  - Monitor labs (i e  albumin)  - Assess for incontinence   - Turn and reposition patient  - Assist with mobility/ambulation  - Relieve pressure over bony prominences  - Avoid friction and shearing  - Provide appropriate hygiene as needed including keeping skin clean and dry  - Evaluate need for skin moisturizer/barrier cream  - Collaborate with interdisciplinary team (i e  Nutrition, Rehabilitation, etc )   - Patient/family teaching  Outcome: Progressing  Goal: Incision(s), wounds(s) or drain site(s) healing without S/S of infection  Description  INTERVENTIONS  - Assess and document risk factors for skin impairment   - Assess and document dressing, incision, wound bed, drain sites and surrounding tissue  - Consider nutrition services referral as needed  - Oral mucous membranes remain intact  - Provide patient/ family education  Outcome: Progressing     Problem: PAIN - ADULT  Goal: Verbalizes/displays adequate comfort level or baseline comfort level  Description  Interventions:  - Encourage patient to monitor pain and request assistance  - Assess pain using appropriate pain scale  - Administer analgesics based on type and severity of pain and evaluate response  - Implement non-pharmacological measures as appropriate and evaluate response  - Consider cultural and social influences on pain and pain management  - Notify physician/advanced practitioner if interventions unsuccessful or patient reports new pain  Outcome: Progressing     Problem: INFECTION - ADULT  Goal: Absence or prevention of progression during hospitalization  Description  INTERVENTIONS:  - Assess and monitor for signs and symptoms of infection  - Monitor lab/diagnostic results  - Monitor all insertion sites, i e  indwelling lines, tubes, and drains  - Monitor endotracheal if appropriate and nasal secretions for changes in amount and color  - [place] appropriate cooling/warming therapies per order  - Administer medications as ordered  - Instruct and encourage patient and family to use good hand hygiene technique  - Identify and instruct in appropriate isolation precautions for identified infection/condition  Outcome: Progressing     Problem: SAFETY ADULT  Goal: Patient will remain free of falls  Description  INTERVENTIONS:  - Assess patient frequently for physical needs  -  Identify cognitive and physical deficits and behaviors that affect risk of falls  -  [place] fall precautions as indicated by assessment   - Educate patient/family on patient safety including physical limitations  - Instruct patient to call for assistance with activity based on assessment  - Modify environment to reduce risk of injury  - Consider OT/PT consult to assist with strengthening/mobility  Outcome: Progressing     Problem: DISCHARGE PLANNING  Goal: Discharge to home or other facility with appropriate resources  Description  INTERVENTIONS:  - Identify barriers to discharge w/patient and caregiver  - Arrange for needed discharge resources and transportation as appropriate  - Identify discharge learning needs (meds, wound care, etc )  - Arrange for interpretive services to assist at discharge as needed  - Refer to Case Management Department for coordinating discharge planning if the patient needs post-hospital services based on physician/advanced practitioner order or complex needs related to functional status, cognitive ability, or social support system  Outcome: Progressing     Problem: Knowledge Deficit  Goal: Patient/family/caregiver demonstrates understanding of disease process, treatment plan, medications, and discharge instructions  Description  Complete learning assessment and assess knowledge base    Interventions:  - Provide teaching at level of understanding  - Provide teaching via preferred learning methods  Outcome: Progressing

## 2019-12-28 NOTE — PLAN OF CARE
Problem: SKIN/TISSUE INTEGRITY - ADULT  Goal: Skin integrity remains intact  Description  INTERVENTIONS  - Identify patients at risk for skin breakdown  - Assess and monitor skin integrity  - Assess and monitor nutrition and hydration status  - Monitor labs (i e  albumin)  - Assess for incontinence   - Turn and reposition patient  - Assist with mobility/ambulation  - Relieve pressure over bony prominences  - Avoid friction and shearing  - Provide appropriate hygiene as needed including keeping skin clean and dry  - Evaluate need for skin moisturizer/barrier cream  - Collaborate with interdisciplinary team (i e  Nutrition, Rehabilitation, etc )   - Patient/family teaching  Outcome: Progressing  Goal: Incision(s), wounds(s) or drain site(s) healing without S/S of infection  Description  INTERVENTIONS  - Assess and document risk factors for skin impairment   - Assess and document dressing, incision, wound bed, drain sites and surrounding tissue  - Consider nutrition services referral as needed  - Oral mucous membranes remain intact  - Provide patient/ family education  Outcome: Progressing     Problem: PAIN - ADULT  Goal: Verbalizes/displays adequate comfort level or baseline comfort level  Description  Interventions:  - Encourage patient to monitor pain and request assistance  - Assess pain using appropriate pain scale  - Administer analgesics based on type and severity of pain and evaluate response  - Implement non-pharmacological measures as appropriate and evaluate response  - Consider cultural and social influences on pain and pain management  - Notify physician/advanced practitioner if interventions unsuccessful or patient reports new pain  Outcome: Progressing     Problem: INFECTION - ADULT  Goal: Absence or prevention of progression during hospitalization  Description  INTERVENTIONS:  - Assess and monitor for signs and symptoms of infection  - Monitor lab/diagnostic results  - Monitor all insertion sites, i e  indwelling lines, tubes, and drains  - Monitor endotracheal if appropriate and nasal secretions for changes in amount and color  - [place] appropriate cooling/warming therapies per order  - Administer medications as ordered  - Instruct and encourage patient and family to use good hand hygiene technique  - Identify and instruct in appropriate isolation precautions for identified infection/condition  Outcome: Progressing     Problem: SAFETY ADULT  Goal: Patient will remain free of falls  Description  INTERVENTIONS:  - Assess patient frequently for physical needs  -  Identify cognitive and physical deficits and behaviors that affect risk of falls  -  [place] fall precautions as indicated by assessment   - Educate patient/family on patient safety including physical limitations  - Instruct patient to call for assistance with activity based on assessment  - Modify environment to reduce risk of injury  - Consider OT/PT consult to assist with strengthening/mobility  Outcome: Progressing     Problem: DISCHARGE PLANNING  Goal: Discharge to home or other facility with appropriate resources  Description  INTERVENTIONS:  - Identify barriers to discharge w/patient and caregiver  - Arrange for needed discharge resources and transportation as appropriate  - Identify discharge learning needs (meds, wound care, etc )  - Arrange for interpretive services to assist at discharge as needed  - Refer to Case Management Department for coordinating discharge planning if the patient needs post-hospital services based on physician/advanced practitioner order or complex needs related to functional status, cognitive ability, or social support system  Outcome: Progressing     Problem: Knowledge Deficit  Goal: Patient/family/caregiver demonstrates understanding of disease process, treatment plan, medications, and discharge instructions  Description  Complete learning assessment and assess knowledge base    Interventions:  - Provide teaching at level of understanding  - Provide teaching via preferred learning methods  Outcome: Progressing     Problem: Potential for Falls  Goal: Patient will remain free of falls  Description  INTERVENTIONS:  - Assess patient frequently for physical needs  -  Identify cognitive and physical deficits and behaviors that affect risk of falls    -  [place] fall precautions as indicated by assessment   - Educate patient/family on patient safety including physical limitations  - Instruct patient to call for assistance with activity based on assessment  - Modify environment to reduce risk of injury  - Consider OT/PT consult to assist with strengthening/mobility  Outcome: Progressing     Problem: MUSCULOSKELETAL - ADULT  Goal: Maintain or return mobility to safest level of function  Description  INTERVENTIONS:  - Assess patient's ability to carry out ADLs; assess patient's baseline for ADL function and identify physical deficits which impact ability to perform ADLs (bathing, care of mouth/teeth, toileting, grooming, dressing, etc )  - Assess/evaluate cause of self-care deficits   - Assess range of motion  - Assess patient's mobility  - Assess patient's need for assistive devices and provide as appropriate  - Encourage maximum independence but intervene and supervise when necessary  - Involve family in performance of ADLs  - Assess for home care needs following discharge   - Consider OT consult to assist with ADL evaluation and planning for discharge  - Provide patient education as appropriate  Outcome: Progressing

## 2019-12-28 NOTE — UTILIZATION REVIEW
Initial Clinical Review    Admission: Date/Time/Statement: Observation 12/27 @ 1219  Orders Placed This Encounter   Procedures    Place in Observation     Standing Status:   Standing     Number of Occurrences:   1     Order Specific Question:   Admitting Physician     Answer:   Kareem Arce     Order Specific Question:   Level of Care     Answer:   Med Surg [16]     ED Arrival Information     Expected Arrival Acuity Means of Arrival Escorted By Service Admission Type    - 12/27/2019 09:20 Urgent Walk-In Spouse Hospitalist Urgent    Arrival Complaint    swollen leg        Chief Complaint   Patient presents with    Leg Swelling     pt c/o left calf swelling  denies any acute injury/strain     Assessment/Plan:   54 y o  male who presents to emergency department with the complaint of pain and ecchymosis of the left lower extremity  Over the last week, the patient has been snowboarding as well as mountain biking, but he did not sustain any injury that he was aware of experiencing  The patient was walking on Thursday, 12/26/2019, and developed severe left calf pain  The patient then developed ecchymosis and edema involving the left calf region  The patient is having difficulty with ambulation secondary to the left calf pain  The patient denies any trauma to the left lower extremity  No chest pain  No shortness of breath  Hematoma of left lower extremity  Assessment & Plan  · Place in 23-hour observation, med/surg level of care  · Serial examinations to monitor the patient's pulses in the left lower extremity  · Serial laboratory testing to monitor the patient's hemoglobin and hematocrit levels  · Consult Orthopedic Surgery  · Check an MRI of the left tibia/fibula region     12/27 Orthopedic cons; Left calf hematoma  Continue to monitor  If patient continues to bleed with the hematoma getting larger and patient becomes neurovascular compromised, he may need a fasciotomy     WBAT LLE, pain control  Ortho will continue to follow  MRI pending    ED Triage Vitals [12/27/19 0926]   Temperature Pulse Respirations Blood Pressure SpO2   97 6 °F (36 4 °C) 75 16 134/82 95 %      Temp Source Heart Rate Source Patient Position - Orthostatic VS BP Location FiO2 (%)   Oral Monitor Sitting Right arm --      Pain Score       6        Wt Readings from Last 1 Encounters:   12/27/19 82 5 kg (181 lb 14 1 oz)     Additional Vital Signs:   12/28/19 08:09:36  98 3 °F (36 8 °C)  68  17  122/76  91  96 %      12/27/19 23:28:21  98 °F (36 7 °C)  68    109/63  78  98 %      12/27/19 19:41:52  98 2 °F (36 8 °C)  75    121/80  94  97 %      12/27/19 18:24:12  98 2 °F (36 8 °C)  65  18  118/81  93  98 %      12/27/19 1500    77  20  138/63    96 %  None (Room air)      Pertinent Labs/Diagnostic Test Results:   12/27 VAS lower limb venous duplex study - Evaluation shows no evidence of thrombus in the common femoral vein  No evidence of acute or chronic deep vein thrombosis    12/27 CT Left Tibia/Fibula -   Intermuscular hematoma at the posteromedial aspect of the left mid leg, possibly due to a traumatic contusion versus regional muscular or tendinous injury, although no discrete injury is identified  No evidence of active arterial extravasation  No acute osseous abnormality      12/27 MRI Left Tibia/ Fibula -     Results from last 7 days   Lab Units 12/28/19  0545 12/27/19  1028   WBC Thousand/uL 6 45 7 90   HEMOGLOBIN g/dL 12 6 12 4   HEMATOCRIT % 36 7 35 8*   PLATELETS Thousands/uL 261 270   NEUTROS ABS Thousands/µL 3 21 4 59         Results from last 7 days   Lab Units 12/28/19  0545 12/27/19  1028   SODIUM mmol/L 138 139   POTASSIUM mmol/L 4 2 3 6   CHLORIDE mmol/L 103 103   CO2 mmol/L 26 26   ANION GAP mmol/L 9 10   BUN mg/dL 17 19   CREATININE mg/dL 0 66 0 74   EGFR ml/min/1 73sq m 115 110   CALCIUM mg/dL 8 1* 8 4   MAGNESIUM mg/dL 2 1  --    PHOSPHORUS mg/dL 3 8  --      Results from last 7 days   Lab Units 12/28/19  0545 12/27/19  1028   AST U/L 34 42   ALT U/L 35 28   ALK PHOS U/L 53 61   TOTAL PROTEIN g/dL 6 7 6 9   ALBUMIN g/dL 3 6 3 8   TOTAL BILIRUBIN mg/dL 0 60 0 70   BILIRUBIN DIRECT mg/dL  --  0 12         Results from last 7 days   Lab Units 12/28/19  0545 12/27/19  1028   GLUCOSE RANDOM mg/dL 104 109     Results from last 7 days   Lab Units 12/28/19  0545   CK TOTAL U/L 1,563*   CK MB INDEX % <1 0   CK MB ng/mL 2 2             Results from last 7 days   Lab Units 12/27/19  1028   PROTIME seconds 12 4   INR  0 92     Results from last 7 days   Lab Units 12/28/19  0545   LACTIC ACID mmol/L 1 1     ED Treatment:   Medication Administration from 12/27/2019 0920 to 12/27/2019 1802       Date/Time Order Dose Route Action     12/27/2019 1116 iohexol (OMNIPAQUE) 350 MG/ML injection (MULTI-DOSE) 100 mL 100 mL Intravenous Given     12/27/2019 1718 traMADol (ULTRAM) tablet 50 mg 50 mg Oral Given        Past Medical History:   Diagnosis Date    Seasonal allergies      Present on Admission:   Hematoma of left lower extremity      Admitting Diagnosis: Leg swelling [M79 89]  Intramuscular hematoma [T14  8XXA]  Hematoma of left lower extremity [S80 12XA]  Age/Sex: 54 y o  male     Admission Orders:  IP CONSULT TO ORTHOPEDIC SURGERY    Scheduled Medications: None    Continuous IV Infusions:  sodium chloride 75 mL/hr Intravenous Continuous     PRN Meds:  acetaminophen 650 mg Oral Q6H PRN 12/28 x1   traMADol 50 mg Oral Q6H PRN 12/28 x1   Or      oxyCODONE 5 mg Oral Q4H PRN     Network Utilization Review Department  [email or web address]  ATTENTION: Please call with any questions or concerns to [phone] and carefully listen to the prompts so that you are directed to the right person   All voicemails are confidential   Isa Portillo all requests for admission clinical reviews, approved or denied determinations and any other requests to dedicated fax number below belonging to the campus where the patient is receiving treatment   List of dedicated fax numbers for the Facilities:  [address] DENIALS (Administrative/Medical Necessity) [phone]   [address] (Maternity/NICU/Pediatrics) [phone]   Jr Danielson [phone]   Jessy Casey [phone]   [facility] [phone]   Estefanyapoorva Deleon [phone]   [address] [phone]   [facility]  [phone]   [address] [place] [address], [address] [phone]

## 2019-12-28 NOTE — PROGRESS NOTES
Progress Note - Lui Stover 1970, 54 y o  male MRN: [id number]    Unit/Bed#: -01 Encounter: [id number]    Primary Care Provider: Anthony Coello MD   Date and time admitted to hospital: 12/27/2019  9:24 AM        * Hematoma of left lower extremity  Assessment & Plan    CT LEFT TIBIA-FIBULA WITH IV CONTRAST     INDICATION: spontaneous hemorrhage    IMPRESSION:     1  Intermuscular hematoma at the posteromedial aspect of the left mid leg, possibly due to a traumatic contusion versus regional muscular or tendinous injury, although no discrete injury is identified  No evidence of active arterial extravasation      2  No acute osseous abnormality         VAS lower limb venous duplex study, unilateral/limited   Status: Final result   PACS Images      Show images for VAS lower limb venous duplex study, unilateral/limited   Study Result        [facility] REPORT  CLINICAL:  Indications: Left Localized edema [R60 0]  Patient states he got a left calf  cramp yesterday and then the calf swelled and bruised  Patient denies recent  travel  Risk Factors  The patient has no history of DVT or Recent Trauma  FINDINGS:     Segment  Right            Left                Impression       Impression         CFV      Normal (Patent)  Normal (Patent)      CONCLUSION:     Impression:  RIGHT LOWER LIMB LIMITED:  Evaluation shows no evidence of thrombus in the common femoral vein  Doppler evaluation shows a normal response to augmentation maneuvers  LEFT LOWER LIMB:  No evidence of acute or chronic deep vein thrombosis  No evidence of superficial thrombophlebitis noted  Doppler evaluation shows a normal response to augmentation maneuvers  Popliteal, posterior tibial and anterior tibial arterial Doppler waveforms are  triphasic  There is intramuscular fluid seen in the left calf in the area of swelling and  bruising         MDiffuse subcutaneous edema       Fluid in the fat planes between the calf musculature most prominent between the medial gastrocnemius and psoas musculature with a slightly hyperechoic T1 appearance in keeping with hemorrhage  No change in size from the CT scan from the previous day  RI of the left foot    Ortho recommendations appreciated  Partial weight-bearing left lower extremity  Crutches recommended  Ace wrap  Continue to elevate legs      VTE Pharmacologic Prophylaxis:   Pharmacologic: Other Medication: Held  Mechanical VTE Prophylaxis in Place: Yes    Patient Centered Rounds: I have performed bedside rounds with nursing staff today  Discussions with Specialists or Other Care Team Provider ortho    Education and Discussions with Family / Patient:  Patient    Time Spent for Care: 20 minutes  More than 50% of total time spent on counseling and coordination of care as described above  Current Length of Stay: 0 day(s)    Current Patient Status: Observation   Certification Statement: The patient will continue to require additional inpatient hospital stay due to Hematoma    Discharge Plan:  Likely discharge tomorrow    Code Status: Level 1 - Full Code      Subjective:   Patient seen and examined  He feels slightly better  Continues to have low left lower extremity edema  He has some ecchymosis which he noticed up to the ankle this morning  Able to walk without any difficulty  Objective:     Vitals:   Temp (24hrs), Av 2 °F (36 8 °C), Min:98 °F (36 7 °C), Max:98 3 °F (36 8 °C)    Temp:  [98 °F (36 7 °C)-98 3 °F (36 8 °C)] 98 3 °F (36 8 °C)  HR:  [65-77] 68  Resp:  [17-20] 17  BP: (109-138)/(63-81) 122/76  SpO2:  [96 %-98 %] 96 %  Body mass index is 32 22 kg/m²  Input and Output Summary (last 24 hours): Intake/Output Summary (Last 24 hours) at 2019 1454  Last data filed at 2019 0900  Gross per 24 hour   Intake 300 ml   Output 300 ml   Net 0 ml       Physical Exam:     Physical Exam   Constitutional: He is oriented to person, place, and time   He appears well-developed and well-nourished  HENT:   Head: Normocephalic and atraumatic  Eyes: Pupils are equal, round, and reactive to light  EOM are normal    Neck: Normal range of motion  Neck supple  Cardiovascular: Normal rate, regular rhythm and intact distal pulses  Pulmonary/Chest: Effort normal and breath sounds normal    Abdominal: Soft  Bowel sounds are normal    Musculoskeletal: Normal range of motion  Neurological: He is alert and oriented to person, place, and time  Skin: Skin is warm and dry  Additional Data:     Labs:    Results from last 7 days   Lab Units 12/28/19  0545   WBC Thousand/uL 6 45   HEMOGLOBIN g/dL 12 6   HEMATOCRIT % 36 7   PLATELETS Thousands/uL 261   NEUTROS PCT % 50   LYMPHS PCT % 36   MONOS PCT % 9   EOS PCT % 4     Results from last 7 days   Lab Units 12/28/19  0545   SODIUM mmol/L 138   POTASSIUM mmol/L 4 2   CHLORIDE mmol/L 103   CO2 mmol/L 26   BUN mg/dL 17   CREATININE mg/dL 0 66   ANION GAP mmol/L 9   CALCIUM mg/dL 8 1*   ALBUMIN g/dL 3 6   TOTAL BILIRUBIN mg/dL 0 60   ALK PHOS U/L 53   ALT U/L 35   AST U/L 34   GLUCOSE RANDOM mg/dL 104     Results from last 7 days   Lab Units 12/27/19  1028   INR  0 92         Results from last 7 days   Lab Units 12/28/19  0545   HEMOGLOBIN A1C % 4 8     Results from last 7 days   Lab Units 12/28/19  0545   LACTIC ACID mmol/L 1 1           * I Have Reviewed All Lab Data Listed Above  * Additional Pertinent Lab Tests Reviewed:  Kasia 66 Admission Reviewed    Imaging:    Imaging Reports Reviewed Today Include:  MRI  Imaging Personally Reviewed by Myself Includes:  CT    Recent Cultures (last 7 days):           Last 24 Hours Medication List:     Current Facility-Administered Medications:  acetaminophen 650 mg Oral Q6H PRN Winter Mason, DO    traMADol 50 mg Oral Q6H PRN Winter Mason, DO    Or        oxyCODONE 5 mg Oral Q4H PRN Winter Mason, DO    sodium chloride 75 mL/hr Intravenous Continuous Syamala Annis Schilder, MD Last Rate: 75 mL/hr (12/28/19 1241)        Today, Patient Was Seen By: Sofya Vigil MD    ** Please Note: Dictation voice to text software may have been used in the creation of this document   **

## 2019-12-28 NOTE — ASSESSMENT & PLAN NOTE
CT LEFT TIBIA-FIBULA WITH IV CONTRAST     INDICATION: spontaneous hemorrhage    IMPRESSION:     1  Intermuscular hematoma at the posteromedial aspect of the left mid leg, possibly due to a traumatic contusion versus regional muscular or tendinous injury, although no discrete injury is identified  No evidence of active arterial extravasation      2  No acute osseous abnormality         VAS lower limb venous duplex study, unilateral/limited   Status: Final result   PACS Images      Show images for VAS lower limb venous duplex study, unilateral/limited   Study Result        [facility] REPORT  CLINICAL:  Indications: Left Localized edema [R60 0]  Patient states he got a left calf  cramp yesterday and then the calf swelled and bruised  Patient denies recent  travel  Risk Factors  The patient has no history of DVT or Recent Trauma  FINDINGS:     Segment  Right            Left                Impression       Impression         CFV      Normal (Patent)  Normal (Patent)      CONCLUSION:     Impression:  RIGHT LOWER LIMB LIMITED:  Evaluation shows no evidence of thrombus in the common femoral vein  Doppler evaluation shows a normal response to augmentation maneuvers  LEFT LOWER LIMB:  No evidence of acute or chronic deep vein thrombosis  No evidence of superficial thrombophlebitis noted  Doppler evaluation shows a normal response to augmentation maneuvers  Popliteal, posterior tibial and anterior tibial arterial Doppler waveforms are  triphasic  There is intramuscular fluid seen in the left calf in the area of swelling and  bruising  MDiffuse subcutaneous edema       Fluid in the fat planes between the calf musculature most prominent between the medial gastrocnemius and psoas musculature with a slightly hyperechoic T1 appearance in keeping with hemorrhage  No change in size from the CT scan from the previous day  RI of the left foot    Ortho recommendations appreciated  Partial weight-bearing left lower extremity    Crutches recommended  Ace wrap  Continue to elevate legs

## 2019-12-28 NOTE — PROGRESS NOTES
Progress Note - Orthopedics   Shawn Nguyen 54 y o  male MRN: [id number]  Unit/Bed#: -01 Encounter: [id number]    Assessment:  Medial Gastroc tear - left  Hematoma - left calf  No compartment syndrome indicated by clinical exam    Plan:  Crutches - PWB left lower extremity  Ace wrap left knee/leg  Discussed gastroc tear  Will use crutches until next week  F/U my office next week  Patient informed of diagnosis and informed of compartment syndrome and symptoms to look out for  He will contact us immediately if any increased pain or swelling  Weight bearing: crutches partial wt bearing left lower extremity    Subjective: Symptoms slightly improved but still has swelling and actually increased visual ecchymosis secondary to skin resorption of hematoma (as expected/normal - does not indicate further bleeding)  Vitals: Blood pressure 122/76, pulse 68, temperature 98 3 °F (36 8 °C), resp  rate 17, height 5' 3" (1 6 m), weight 82 5 kg (181 lb 14 1 oz), SpO2 96 %  ,Body mass index is 32 22 kg/m²  Intake/Output Summary (Last 24 hours) at 12/28/2019 1412  Last data filed at 12/28/2019 0900  Gross per 24 hour   Intake 300 ml   Output 300 ml   Net 0 ml       Invasive Devices     Peripheral Intravenous Line            Peripheral IV 12/27/19 Left Antecubital 1 day                Physical Exam: A&O, VSS  NAD  Moderate calf swelling  Some discomfort with passive stretch (ankle dorsiflexion) but not severe  Sensation intact to light touch  Moves toes and ankle well  Good ankle dorsi and plantar flexion strength      Lab, Imaging and other studies:   CK normal  HGb A1C 4 8  Lactic acid 1 1    MRI - No official reading  To me looks like medial gastroc tear and hematoma

## 2019-12-29 VITALS
DIASTOLIC BLOOD PRESSURE: 79 MMHG | WEIGHT: 181.88 LBS | RESPIRATION RATE: 18 BRPM | BODY MASS INDEX: 32.23 KG/M2 | OXYGEN SATURATION: 96 % | HEART RATE: 71 BPM | TEMPERATURE: 97.5 F | HEIGHT: 63 IN | SYSTOLIC BLOOD PRESSURE: 120 MMHG

## 2019-12-29 LAB
ERYTHROCYTE [DISTWIDTH] IN BLOOD BY AUTOMATED COUNT: 12.3 % (ref 11.6–15.1)
HCT VFR BLD AUTO: 35.3 % (ref 36.5–49.3)
HGB BLD-MCNC: 11.9 G/DL (ref 12–17)
MCH RBC QN AUTO: 31.5 PG (ref 26.8–34.3)
MCHC RBC AUTO-ENTMCNC: 33.7 G/DL (ref 31.4–37.4)
MCV RBC AUTO: 93 FL (ref 82–98)
PLATELET # BLD AUTO: 246 THOUSANDS/UL (ref 149–390)
PMV BLD AUTO: 9.5 FL (ref 8.9–12.7)
PROCALCITONIN SERPL-MCNC: <0.05 NG/ML
RBC # BLD AUTO: 3.78 MILLION/UL (ref 3.88–5.62)
WBC # BLD AUTO: 5.27 THOUSAND/UL (ref 4.31–10.16)

## 2019-12-29 PROCEDURE — 97116 GAIT TRAINING THERAPY: CPT

## 2019-12-29 PROCEDURE — 97163 PT EVAL HIGH COMPLEX 45 MIN: CPT

## 2019-12-29 PROCEDURE — 85027 COMPLETE CBC AUTOMATED: CPT | Performed by: INTERNAL MEDICINE

## 2019-12-29 PROCEDURE — G8978 MOBILITY CURRENT STATUS: HCPCS

## 2019-12-29 PROCEDURE — 99217 PR OBSERVATION CARE DISCHARGE MANAGEMENT: CPT | Performed by: INTERNAL MEDICINE

## 2019-12-29 PROCEDURE — G8980 MOBILITY D/C STATUS: HCPCS

## 2019-12-29 RX ORDER — ACETAMINOPHEN 325 MG/1
650 TABLET ORAL EVERY 6 HOURS PRN
Qty: 30 TABLET | Refills: 0 | Status: SHIPPED | OUTPATIENT
Start: 2019-12-29 | End: 2020-01-31

## 2019-12-29 RX ORDER — TRAMADOL HYDROCHLORIDE 50 MG/1
50 TABLET ORAL EVERY 8 HOURS PRN
Qty: 15 TABLET | Refills: 0 | Status: SHIPPED | OUTPATIENT
Start: 2019-12-29 | End: 2020-01-08

## 2019-12-29 RX ADMIN — ACETAMINOPHEN 650 MG: 325 TABLET, FILM COATED ORAL at 09:21

## 2019-12-29 RX ADMIN — TRAMADOL HYDROCHLORIDE 50 MG: 50 TABLET, FILM COATED ORAL at 04:45

## 2019-12-29 RX ADMIN — TRAMADOL HYDROCHLORIDE 50 MG: 50 TABLET, FILM COATED ORAL at 10:33

## 2019-12-29 RX ADMIN — SODIUM CHLORIDE 75 ML/HR: 0.9 INJECTION, SOLUTION INTRAVENOUS at 02:23

## 2019-12-29 NOTE — ASSESSMENT & PLAN NOTE
CT LEFT TIBIA-FIBULA WITH IV CONTRAST     INDICATION: spontaneous hemorrhage    IMPRESSION:     1  Intermuscular hematoma at the posteromedial aspect of the left mid leg, possibly due to a traumatic contusion versus regional muscular or tendinous injury, although no discrete injury is identified  No evidence of active arterial extravasation      2  No acute osseous abnormality         VAS lower limb venous duplex study, unilateral/limited   Status: Final result   PACS Images      Show images for VAS lower limb venous duplex study, unilateral/limited   Study Result        [facility] REPORT  CLINICAL:  Indications: Left Localized edema [R60 0]  Patient states he got a left calf  cramp yesterday and then the calf swelled and bruised  Patient denies recent  travel  Risk Factors  The patient has no history of DVT or Recent Trauma  FINDINGS:     Segment  Right            Left                Impression       Impression         CFV      Normal (Patent)  Normal (Patent)      CONCLUSION:     Impression:  RIGHT LOWER LIMB LIMITED:  Evaluation shows no evidence of thrombus in the common femoral vein  Doppler evaluation shows a normal response to augmentation maneuvers  LEFT LOWER LIMB:  No evidence of acute or chronic deep vein thrombosis  No evidence of superficial thrombophlebitis noted  Doppler evaluation shows a normal response to augmentation maneuvers  Popliteal, posterior tibial and anterior tibial arterial Doppler waveforms are  triphasic  There is intramuscular fluid seen in the left calf in the area of swelling and  bruising  MDiffuse subcutaneous edema       Fluid in the fat planes between the calf musculature most prominent between the medial gastrocnemius and psoas musculature with a slightly hyperechoic T1 appearance in keeping with hemorrhage  No change in size from the CT scan from the previous day  Discussed with Ortho recommendations appreciated    Believe possible gastro tear that could have caused hematoma  Partial weight-bearing left lower extremity    Crutches recommended  Ace wrap  Continue to elevate legs  Outpatient follow-up after discharge

## 2019-12-29 NOTE — PLAN OF CARE
Problem: MUSCULOSKELETAL - ADULT  Goal: Maintain or return mobility to safest level of function  Description  INTERVENTIONS:  - Assess patient's ability to carry out ADLs; assess patient's baseline for ADL function and identify physical deficits which impact ability to perform ADLs (bathing, care of mouth/teeth, toileting, grooming, dressing, etc )  - Assess/evaluate cause of self-care deficits   - Assess range of motion  - Assess patient's mobility  - Assess patient's need for assistive devices and provide as appropriate  - Encourage maximum independence but intervene and supervise when necessary  - Involve family in performance of ADLs  - Assess for home care needs following discharge   - Consider OT consult to assist with ADL evaluation and planning for discharge  - Provide patient education as appropriate  Outcome: Progressing     Problem: SKIN/TISSUE INTEGRITY - ADULT  Goal: Skin integrity remains intact  Description  INTERVENTIONS  - Identify patients at risk for skin breakdown  - Assess and monitor skin integrity  - Assess and monitor nutrition and hydration status  - Monitor labs (i e  albumin)  - Continent of bowel and bladder   Pt repositions independently  - Assist with mobility/ambulation  - Relieve pressure over bony prominences  - Avoid friction and shearing  - Provide appropriate hygiene as needed including keeping skin clean and dry  - Evaluate need for skin moisturizer/barrier cream  - Collaborate with interdisciplinary team (i e  Nutrition, Rehabilitation, etc )   - Patient/family teaching   Outcome: Progressing     Problem: PAIN - ADULT  Goal: Verbalizes/displays adequate comfort level or baseline comfort level  Description  Interventions:  - Encourage patient to monitor pain and request assistance  - Assess pain using appropriate pain scale  - Administer analgesics based on type and severity of pain and evaluate response  - Implement non-pharmacological measures as appropriate and evaluate response  - Consider cultural and social influences on pain and pain management  - Notify physician/advanced practitioner if interventions unsuccessful or patient reports new pain  Outcome: Progressing     Problem: DISCHARGE PLANNING  Goal: Discharge to home or other facility with appropriate resources  Description  INTERVENTIONS:  - Identify barriers to discharge w/patient and caregiver  - Arrange for needed discharge resources and transportation as appropriate  - Identify discharge learning needs (meds, wound care, etc )  - Arrange for interpretive services to assist at discharge as needed  - Refer to Case Management Department for coordinating discharge planning if the patient needs post-hospital services based on physician/advanced practitioner order or complex needs related to functional status, cognitive ability, or social support system  Outcome: Progressing     Problem: Potential for Falls  Goal: Patient will remain free of falls  Description  INTERVENTIONS:  - Assess patient frequently for physical needs  -  Identify cognitive and physical deficits and behaviors that affect risk of falls  Pt is PWB to LLE crutches ordered    -  [place] fall precautions as indicated by assessment   - Educate patient/family on patient safety including physical limitations  - Instruct patient to call for assistance with activity based on assessment  - Modify environment to reduce risk of injury  - Consider OT/PT consult to assist with strengthening/mobility   Outcome: Progressing

## 2019-12-29 NOTE — PHYSICAL THERAPY NOTE
PHYSICAL THERAPY Evaluation NOTE    Patient Name: Aysha Parikh  GVWVU'[id number] Date: 12/29/2019     AGE:   54 y o  Mrn:   [id number]  ADMIT DX:  Leg swelling [M79 89]  Intramuscular hematoma [T14  8XXA]  Hematoma of left lower extremity [S80 12XA]    Past Medical History:   Diagnosis Date    Seasonal allergies      Length Of Stay: 0  PHYSICAL THERAPY EVALUATION :    12/29/19 6043   Note Type   Note type Eval/Treat   Pain Assessment   Pain Assessment 0-10   Pain Score 5   Pain Type Acute pain   Pain Location Leg   Pain Orientation Left; Lower   Home Living   Type of Home House  (2 , few ARACELI)   Home Layout Two level;Stairs to enter with rails;Bed/bath upstairs   Bathroom Shower/Tub Tub/shower unit   Bathroom Toilet Standard   Bathroom Accessibility Accessible   Home Equipment   (NO AD prior to admission)   Additional Comments Pt  lives with spouse and 2 Sons in 2 , few ARACELI   Prior Function   Level of [place] Independent with ADLs and functional mobility  (+ , works full time as a )   Lives With [employer] [facility] in the last 6 months 0   Vocational Full time employment   Comments PTA, Pt  reports INDEP with ADLs, iADLs and functional mobility without AD   Restrictions/Precautions   Weight Bearing Precautions Per Order Yes   LLE Weight Bearing Per Order PWB   General   Additional Pertinent History Pt  is a 53 yo M who presents with gastrocnemius strain and Hematoma of LLE   PWB    Family/Caregiver Present Yes   Cognition   Overall Cognitive Status WFL   Arousal/Participation Cooperative   Orientation Level Oriented X4   Memory Within functional limits   Following Commands Follows all commands and directions without difficulty   Comments Pt  identified with full name and birthdate   RUE Assessment   RUE Assessment WFL   LUE Assessment   LUE Assessment WFL   RLE Assessment   RLE Assessment WFL   LLE Assessment   LLE Assessment   (limited due to pain)   Coordination   Movements are Fluid and Coordinated 0   Coordination and Movement Description antalgic functional mobility   Sensation WFL   Light Touch   RLE Light Touch Grossly intact   LLE Light Touch Grossly intact   Bed Mobility   Supine to Sit Unable to assess   Additional Comments Pt  seated OOB in recliner prior to and followng PT session   Transfers   Sit to Stand 6  Modified independent   Additional items Increased time required   Stand to Sit 6  Modified independent   Additional items Increased time required   Ambulation/Elevation   Gait pattern Improper Weight shift;Decreased L stance;Decreased foot clearance; Inconsistent sravani; Short stride;Redundant gait at times; Step to;Excessively slow   Gait Assistance 6  Modified independent   Additional items Verbal cues  (for posture, gait sequencing, and PWB status)   Assistive Device Crutches   Distance 100'x1   Stair Management Assistance 6  Modified independent   Additional items Verbal cues  (for sequencing)   Stair Management Technique One rail R;With crutches   Number of Stairs 4   Balance   Static Sitting Good   Dynamic Sitting Fair +   Static Standing Fair   Dynamic Standing Fair -   Ambulatory Fair -   Endurance Deficit   Endurance Deficit No   Activity Tolerance   Activity Tolerance Patient tolerated treatment well   Nurse Made Aware Spoke to RN   Assessment   Prognosis Good   Problem List Decreased strength;Decreased range of motion;Decreased endurance; Impaired balance;Decreased mobility;Orthopedic restrictions;Pain   Assessment Pt  is a 55 yo M who presents with gastrocnemius strain and Hematoma of LLE  PWB LLE   order placed for PT eval and tx, w/ activity order of PWB L LE  pt presents w/ comorbidities of Leg swelling, Intramuscular hematoma, gastrocnemius tear and personal factors of living in 2 story house, mobilizing w/ assistive device, stair(s) to enter home, unable to perform dynamic tasks in community, decreased initiation and engagement, inability to perform current job functions, unable to perform caregiver support/tasks, inability to perform IADLs, inability to perform ADLs and inability to live alone  pt presents w/ weakness, decreased ROM, impaired balance, gait deviations, impaired coordination, orthopedic restrictions, fall risk, LE edema and impaired skin integrity  these impairments are evident in findings from physical examination (weakness, decreased ROM, impaired coordination and edema of extremities), mobility assessment (need for Mod I  assist w/ all phases of mobility when usually mobilizing independently, tolerance to only 100 feet of ambulation and need for cueing for mobility technique), and Barthel Index: 100/100  pt needed input for task focus and mobility technique  pt is at risk for falls due to physical and safety awareness deficits  pt's clinical presentation is unstable/unpredictable (evident in need for assist w/ all phases of mobility when usually mobilizing independently, tolerance to only 100 feet of ambulation, need for input for mobility technique, need for input for task focus and mobility technique and need for input for mobility technique/safety)  D/C IP PT   discharge recommendation is for home w/ family support to reduce fall risk and maximize level of functional independence  Goals   Patient Goals to get home    Recommendation   Recommendation Home with family support;D/C PT   Equipment Recommended Crutches   PT - OK to Discharge Yes   Additional Comments When medically appropriate   Barthel Index   Feeding 10   Bathing 5   Grooming Score 5   Dressing Score 10   Bladder Score 10   Bowels Score 10   Toilet Use Score 10   Transfers (Bed/Chair) Score 15   Mobility (Level Surface) Score 15   Stairs Score 10   Barthel Index Score 100     Recommend Home with family support at this time       [id number] Treatment Note    S: Pt  Agreeable to continued PT session, requesting continued ambulation trial    O: Pt  Educated on tub transfer technique and well as recommended use of tub transfer bench for safe mobility in and out of tub/shower  Pt  Agreeable this will be the best option for him  Pt  Ambulated 250'x1 with crutches and PWB LLE with VCs for posture and gait mechanics with good carryover noted throughout PT session  A: Pt  Continues to demonstrate good compliance with WB status even as fatigue demonstrated  Pt  Reports no concerns for discharge home and current level of mobility and will benefit from family support at home to decrease fall risk and aid patient in safe functional mobility  P: D/C IP PT   Recommend Home with family support when medically appropriate    Edna Camacho, PT, DPT 12/29/2019

## 2019-12-29 NOTE — DISCHARGE SUMMARY
Discharge- Marquez Wise 1970, 54 y o  male MRN: [id number]    Unit/Bed#: -01 Encounter: [id number]    Primary Care Provider: Edna Montalvo MD   Date and time admitted to hospital: 12/27/2019  9:24 AM        * Hematoma of left lower extremity  Assessment & Plan    CT LEFT TIBIA-FIBULA WITH IV CONTRAST     INDICATION: spontaneous hemorrhage    IMPRESSION:     1  Intermuscular hematoma at the posteromedial aspect of the left mid leg, possibly due to a traumatic contusion versus regional muscular or tendinous injury, although no discrete injury is identified  No evidence of active arterial extravasation      2  No acute osseous abnormality         VAS lower limb venous duplex study, unilateral/limited   Status: Final result   PACS Images      Show images for VAS lower limb venous duplex study, unilateral/limited   Study Result        [facility] REPORT  CLINICAL:  Indications: Left Localized edema [R60 0]  Patient states he got a left calf  cramp yesterday and then the calf swelled and bruised  Patient denies recent  travel  Risk Factors  The patient has no history of DVT or Recent Trauma  FINDINGS:     Segment  Right            Left                Impression       Impression         CFV      Normal (Patent)  Normal (Patent)      CONCLUSION:     Impression:  RIGHT LOWER LIMB LIMITED:  Evaluation shows no evidence of thrombus in the common femoral vein  Doppler evaluation shows a normal response to augmentation maneuvers  LEFT LOWER LIMB:  No evidence of acute or chronic deep vein thrombosis  No evidence of superficial thrombophlebitis noted  Doppler evaluation shows a normal response to augmentation maneuvers  Popliteal, posterior tibial and anterior tibial arterial Doppler waveforms are  triphasic  There is intramuscular fluid seen in the left calf in the area of swelling and  bruising         MDiffuse subcutaneous edema       Fluid in the fat planes between the calf musculature most prominent between the medial gastrocnemius and psoas musculature with a slightly hyperechoic T1 appearance in keeping with hemorrhage  No change in size from the CT scan from the previous day  Discussed with Ortho recommendations appreciated  Believe possible gastro tear that could have caused hematoma  Partial weight-bearing left lower extremity  Crutches recommended  Ace wrap  Continue to elevate legs  Outpatient follow-up after discharge      Discharging Physician / Practitioner: Carlos Gunderson MD  PCP: Neli Tai MD  Admission Date:   Admission Orders (From admission, onward)     Ordered        12/27/19 1219  Place in Observation  Once                   Discharge Date: 12/29/19    Resolved Problems  Date Reviewed: 12/29/2019    None          Consultations During Hospital Stay:  · Ortho    Procedures Performed:   · None    Significant Findings / Test Results:   · MRI tibia/fibula  Diffuse subcutaneous edema       Fluid in the fat planes between the calf musculature most prominent between the medial gastrocnemius and psoas musculature with a slightly hyperechoic T1 appearance in keeping with hemorrhage  No change in size from the CT scan from the previous day  Incidental Findings:   · None    Test Results Pending at Discharge (will require follow up): · None     Outpatient Tests Requested:  · None    Complications:  None    Reason for Admission:  Left lower extremity pain and swelling    Hospital Course:     Kae Aguirre is a 54 y o  male patient with no significant past medical history who originally presented to the hospital on 12/27/2019 due to pain and ecchymosis of left lower extremity  Over the last week patient has been snow boarding as well as mountain biking, however denies any injuries during the events  CT of the left lower extremity/MRI demonstrated intramuscular hematoma at the posterior medial aspect of left mid leg likely a traumatic contusion of calf muscle    Ortho evaluated patient, recommended supportive care  Patient did not have any evidence of compartment syndrome  Recommended partial weight-bearing and usage of crutches  Further intervention plan  Patient clinically medically stable  Hemoglobin went stable  Recommended outpatient ortho follow-up    Please see above list of diagnoses and related plan for additional information  Condition at Discharge: stable     Discharge Day Visit / Exam:     Subjective:  Patient seen and examined  Denies any complaints at this time  Able to ambulate without any difficulty  Mild lower extremity pain  Able to walk with help of crutches  Vitals: Blood Pressure: 120/79 (12/29/19 0845)  Pulse: 71 (12/29/19 0845)  Temperature: 97 5 °F (36 4 °C) (12/29/19 0845)  Temp Source: Oral (12/28/19 2107)  Respirations: 18 (12/29/19 0845)  Height: 5' 3" (160 cm) (12/27/19 0926)  Weight - Scale: 82 5 kg (181 lb 14 1 oz) (12/27/19 0926)  SpO2: 96 % (12/29/19 0845)  Exam:   Physical Exam   Constitutional: He is oriented to person, place, and time  He appears well-developed and well-nourished  Eyes: Pupils are equal, round, and reactive to light  EOM are normal    Neck: Normal range of motion  Neck supple  Cardiovascular: Normal rate and regular rhythm  Pulmonary/Chest: Effort normal and breath sounds normal    Abdominal: Soft  Bowel sounds are normal    Musculoskeletal: Normal range of motion  He exhibits edema and tenderness  Left lower extremity swollen  Areas of ecchymosis noted in left lower extremity  Left calf  Tender to touch   Neurological: He is alert and oriented to person, place, and time  Skin: Skin is warm  Discussion with Family:  None    Discharge instructions/Information to patient and family:   See after visit summary for information provided to patient and family  Provisions for Follow-Up Care:  See after visit summary for information related to follow-up care and any pertinent home health orders  Disposition:     Home        Planned Readmission none  Discharge Statement:  I spent 25 minutes discharging the patient  This time was spent on the day of discharge  I had direct contact with the patient on the day of discharge  Greater than 50% of the total time was spent examining patient, answering all patient questions, arranging and discussing plan of care with patient as well as directly providing post-discharge instructions  Additional time then spent on discharge activities  Discharge Medications:  See after visit summary for reconciled discharge medications provided to patient and family        ** Please Note: This note has been constructed using a voice recognition system **

## 2019-12-30 ENCOUNTER — TRANSITIONAL CARE MANAGEMENT (OUTPATIENT)
Dept: FAMILY MEDICINE CLINIC | Facility: CLINIC | Age: 49
End: 2019-12-30

## 2020-01-03 ENCOUNTER — OFFICE VISIT (OUTPATIENT)
Dept: FAMILY MEDICINE CLINIC | Facility: CLINIC | Age: 50
End: 2020-01-03
Payer: COMMERCIAL

## 2020-01-03 VITALS
BODY MASS INDEX: 31.01 KG/M2 | WEIGHT: 175 LBS | OXYGEN SATURATION: 94 % | DIASTOLIC BLOOD PRESSURE: 86 MMHG | TEMPERATURE: 98.8 F | HEIGHT: 63 IN | HEART RATE: 82 BPM | SYSTOLIC BLOOD PRESSURE: 130 MMHG

## 2020-01-03 DIAGNOSIS — F41.9 ANXIETY: ICD-10-CM

## 2020-01-03 DIAGNOSIS — E66.09 CLASS 1 OBESITY DUE TO EXCESS CALORIES WITHOUT SERIOUS COMORBIDITY WITH BODY MASS INDEX (BMI) OF 31.0 TO 31.9 IN ADULT: ICD-10-CM

## 2020-01-03 DIAGNOSIS — Z09 HOSPITAL DISCHARGE FOLLOW-UP: ICD-10-CM

## 2020-01-03 DIAGNOSIS — F51.01 PRIMARY INSOMNIA: ICD-10-CM

## 2020-01-03 DIAGNOSIS — Z13.220 LIPID SCREENING: ICD-10-CM

## 2020-01-03 DIAGNOSIS — S80.12XD HEMATOMA OF LEFT LOWER EXTREMITY, SUBSEQUENT ENCOUNTER: Primary | ICD-10-CM

## 2020-01-03 PROBLEM — E66.811 CLASS 1 OBESITY DUE TO EXCESS CALORIES WITHOUT SERIOUS COMORBIDITY WITH BODY MASS INDEX (BMI) OF 31.0 TO 31.9 IN ADULT: Status: ACTIVE | Noted: 2020-01-03

## 2020-01-03 PROCEDURE — 99495 TRANSJ CARE MGMT MOD F2F 14D: CPT | Performed by: FAMILY MEDICINE

## 2020-01-03 RX ORDER — TRAZODONE HYDROCHLORIDE 50 MG/1
50 TABLET ORAL
Qty: 30 TABLET | Refills: 5 | Status: SHIPPED | OUTPATIENT
Start: 2020-01-03 | End: 2020-03-19 | Stop reason: SDUPTHER

## 2020-01-03 NOTE — LETTER
January 3, 2020     Patient: Pam Orlando   YOB: 1970   Date of Visit: 1/3/2020       To Whom it May Concern:    Guero Stephenson is under my professional care  He was seen in my office on 1/3/2020  He may return to work on 1/8/2020  If you have any questions or concerns, please don't hesitate to call           Sincerely,          Hernan Weiss MD        CC: No Recipients

## 2020-01-03 NOTE — PROGRESS NOTES
BMI Counseling: Body mass index is 31 kg/m²  The BMI is above normal  Nutrition recommendations include reducing portion sizes, decreasing overall calorie intake and 3-5 servings of fruits/vegetables daily

## 2020-01-03 NOTE — ASSESSMENT & PLAN NOTE
BMI Counseling: Body mass index is 31 kg/m²  The BMI is above normal  Nutrition recommendations include decreasing overall calorie intake  Exercise recommendations include exercising 3-5 times per week

## 2020-01-03 NOTE — PROGRESS NOTES
Assessment/Plan:        Problem List Items Addressed This Visit        Other    Hematoma of left lower extremity - Primary     Follow up with ortho         Anxiety     Discussed treatment options  He wants to try Trazodone at bedtime  discussed common side effects  Relevant Medications    traZODone (DESYREL) 50 mg tablet    Class 1 obesity due to excess calories without serious comorbidity with body mass index (BMI) of 31 0 to 31 9 in adult     BMI Counseling: Body mass index is 31 kg/m²  The BMI is above normal  Nutrition recommendations include decreasing overall calorie intake  Exercise recommendations include exercising 3-5 times per week  Primary insomnia     Start Trazodone          Relevant Medications    traZODone (DESYREL) 50 mg tablet      Other Visit Diagnoses     Hospital discharge follow-up        Lipid screening        Relevant Orders    Lipid Panel with Direct LDL reflex           Subjective:     Patient ID: Flakito Mo is a 54 y o  male  Flakito Mo is a 54 y o  male patient with no significant past medical history who originally presented to the hospital on 12/27/2019 due to pain and ecchymosis of left lower extremity  He had been snow boarding as well as mountain biking, however denies any injuries during the events  CT of the left lower extremity/MRI demonstrated intramuscular hematoma at the posterior medial aspect of left mid leg likely a traumatic contusion of calf muscle  Ortho evaluated patient, recommended supportive care  Patient did not have any evidence of compartment syndrome  He is using crutches, ice, elevation  He has follow up with Dr Sam Forrest on 1/8/20  He needs a work note until his appointment  Pain improved, sometimes a 4/10  He says the swelling is improving  He is using Tylenol as needed  He says he is having increased anxiety and having trouble sleeping  He says he has had anxiety for 20+ years   He is interested in trying something for sleep  Not depressed PHQ 2 = 0      Review of Systems   Constitutional: Negative for activity change, appetite change, fatigue and unexpected weight change  Respiratory: Negative for chest tightness and shortness of breath  Cardiovascular: Positive for leg swelling  Negative for chest pain  Gastrointestinal: Negative for abdominal pain  Skin: Positive for color change  Neurological: Negative for headaches  Psychiatric/Behavioral: Positive for sleep disturbance  Negative for dysphoric mood  The patient is nervous/anxious  Objective:     Physical Exam   Constitutional: He is oriented to person, place, and time  He appears well-developed and well-nourished  No distress  HENT:   Head: Normocephalic and atraumatic  Cardiovascular: Normal rate, regular rhythm and normal heart sounds  Exam reveals no gallop and no friction rub  No murmur heard  Pulmonary/Chest: Effort normal and breath sounds normal  No respiratory distress  He has no wheezes  He has no rales  He exhibits no tenderness  Musculoskeletal: He exhibits edema  Left lower leg: He exhibits swelling  Neurological: He is alert and oriented to person, place, and time  Skin: Bruising noted  He is not diaphoretic  Psychiatric: He has a normal mood and affect  His behavior is normal  Judgment and thought content normal    Nursing note and vitals reviewed  Vitals:    01/03/20 1506   BP: 130/86   Pulse: 82   Temp: 98 8 °F (37 1 °C)   SpO2: 94%   Weight: 79 4 kg (175 lb)   Height: 5' 3" (1 6 m)       Transitional Care Management Review:  Marilia Vizcarra is a 54 y o  male here for TCM follow up       During the TCM phone call patient stated:    TCM Call (since 12/3/2019)     Date and time call was made  12/30/2019 11:05 AM    Patient was hospitialized at  [facility]    Date of Admission  12/27/19    Date of discharge  12/29/19    Diagnosis  Hematoma of left lower extremity    Disposition  Home Current Symptoms  Leg pain - left side    Left side leg pain severity  Moderate      TCM Call (since 12/3/2019)     Did you obtain your prescribed medications  Yes    Do you need help managing your prescriptions or medications  No    Is transportation to your appointment needed  No    I have advised the patient to call PCP with any new or worsening symptoms  Jonathon Guillaume MD

## 2020-01-07 ENCOUNTER — APPOINTMENT (OUTPATIENT)
Dept: LAB | Facility: CLINIC | Age: 50
End: 2020-01-07
Payer: COMMERCIAL

## 2020-01-07 DIAGNOSIS — Z13.220 LIPID SCREENING: ICD-10-CM

## 2020-01-07 LAB
CHOLEST SERPL-MCNC: 284 MG/DL (ref 50–200)
HDLC SERPL-MCNC: 37 MG/DL
LDLC SERPL DIRECT ASSAY-MCNC: 184 MG/DL (ref 0–100)
TRIGL SERPL-MCNC: 469 MG/DL

## 2020-01-07 PROCEDURE — 83721 ASSAY OF BLOOD LIPOPROTEIN: CPT

## 2020-01-07 PROCEDURE — 36415 COLL VENOUS BLD VENIPUNCTURE: CPT

## 2020-01-07 PROCEDURE — 80061 LIPID PANEL: CPT

## 2020-01-10 ENCOUNTER — OFFICE VISIT (OUTPATIENT)
Dept: OBGYN CLINIC | Facility: CLINIC | Age: 50
End: 2020-01-10
Payer: COMMERCIAL

## 2020-01-10 VITALS
SYSTOLIC BLOOD PRESSURE: 115 MMHG | HEART RATE: 90 BPM | DIASTOLIC BLOOD PRESSURE: 75 MMHG | HEIGHT: 63 IN | WEIGHT: 172 LBS | BODY MASS INDEX: 30.48 KG/M2

## 2020-01-10 DIAGNOSIS — S86.812A STRAIN OF CALF MUSCLE, LEFT, INITIAL ENCOUNTER: Primary | ICD-10-CM

## 2020-01-10 PROCEDURE — 99213 OFFICE O/P EST LOW 20 MIN: CPT | Performed by: ORTHOPAEDIC SURGERY

## 2020-01-10 NOTE — LETTER
January 10, 2020     Patient: Valery Yun   YOB: 1970   Date of Visit: 1/10/2020       To Whom it May Concern:    Akosua Milian is under my professional care  He was seen in my office on 1/10/2020  He e may return to work 1/13/2020  Please excuse all days missed due to injury  If you have any questions or concerns, please don't hesitate to call           Sincerely,          Delicia Reeves MD        CC: No Recipients

## 2020-01-10 NOTE — PROGRESS NOTES
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 lower extremity  Patient was seen in the emergency department 12/27/2019 for evaluation of his left lower extremity due to pain and swelling  Patient was admitted, had ortho consult, venous Doppler, MRI and CT scan  Patient stated that prior to his hospital stay  he had been doing some biking as well as snowboarding although denies no specific injury  Today patient states that he still has difficulty with dorsiflexion  Patient states that he has had some improvement since original visit to the ED  Patient is able to bear minimal weight as he shuffles around the house  Patient states otherwise he has been ambulating with crutches  ROS:   General: No fever, no chills, no weight loss, no weight gain  HEENT:  No loss of hearing, no nose bleeds, no sore throat  Eyes:  No eye pain, no red eyes, no visual disturbance  Respiratory:  No cough, no shortness of breath, no wheezing  Cardiovascular:  No chest pain, no palpitations, no edema  GI: No abdominal pain, no nausea, no vomiting  Endocrine: No frequent urination, no excessive thirst  Urinary:  No dysuria, no hematuria, no incontinence  Musculoskeletal: see HPI and PE  Skin:  No rash, no wounds  Neurological:  No dizziness, no headache, no numbness  Psychiatric:  No difficulty concentrating, no depression, no suicide thoughts, no anxiety  Review of all other systems is negative    PMH:  Past Medical History:   Diagnosis Date    Anxiety     Seasonal allergies        PSH:  History reviewed  No pertinent surgical history      Medications:  Current Outpatient Medications   Medication Sig Dispense Refill    traZODone (DESYREL) 50 mg tablet Take 1 tablet (50 mg total) by mouth daily at bedtime 30 tablet 5    acetaminophen (TYLENOL) 325 mg tablet Take 2 tablets (650 mg total) by mouth every 6 (six) hours as needed for mild pain, headaches or fever (Patient not taking: Reported on 1/10/2020) 30 tablet 0     No current facility-administered medications for this visit  Allergies:  No Known Allergies    Family History:  Family History   Problem Relation Age of Onset   Birdie Sahu Breast cancer Mother     Hypertension Mother     No Known Problems Father        Social History:  Social History     Occupational History    Not on file   Tobacco Use    Smoking status: Never Smoker    Smokeless tobacco: Never Used    Tobacco comment: Former Smoke as per [place]  Substance and Sexual Activity    Alcohol use: Yes     Frequency: Monthly or less     Comment: socially    Drug use: Never    Sexual activity: Not on file       Physical Exam:  General :  Alert, cooperative, no distress, appears stated age  Blood pressure 115/75, pulse 90, height 5' 3" (1 6 m), weight 78 kg (172 lb)  Head:  Normocephalic, without obvious abnormality, atraumatic   Eyes:  Conjunctiva/corneas clear, EOM's intact,   Ears: Both ears normal appearance, no hearing deficits  Nose: Nares normal, septum midline, no drainage    Neck: Supple,  trachea midline, no adenopathy, no tenderness, no mass   Back:   Symmetric, no curvature, ROM normal, no tenderness   Lungs:   Respirations unlabored   Chest Wall:  No tenderness or deformity   Extremities: Extremities normal, atraumatic, no cyanosis or edema      Pulses: 2+ and symmetric   Skin: Skin color, texture, turgor normal, no rashes or lesions      Neurologic: Normal           Left Ankle Exam     Comments:  Ecchymosis noted over entire foot  Tender to palpation over the soleus muscle  Calf mildly firm to palpation  No evidence of compartment syndrome  Able to move ankle and toes without pain            Imaging Studies: The following imaging studies were reviewed in office today  My findings are noted    MRI left  tib-fib performed 12/27/2019 shows fluid in the fat planes between the calf musculature most prominent between the medial gastrocnemius and soleus musculature    CT of left tib fib performed 12/27/2019 shows intramuscular hematoma at the posterior medial aspect of mid leg, no evidence of active articular extravasation, no acute osseous abnormalities    Venous Doppler bilateral lower extremities performed 12/27/2019 shows no acute DVT  Assessment  No diagnosis found  Plan:  Patient was provided with a Cam boot with a wedge lift  Patient was advised to wear this while ambulating at all times to help decrease discomfort  Order was placed for therapy to begin working on range of motion and use of modalities  Patient was advised to continue crutches as needed  Patient was provided with a note to return to work on 01/13/20  The patient will follow up in the office for re-evaluation in 3 weeks although if patient has any questions or concerns he was advised to call sooner        Scribe Attestation    I,:   Isabella Maxwell am acting as a scribe while in the presence of the attending physician :        I,:   Rosey Morton MD personally performed the services described in this documentation    as scribed in my presence :

## 2020-01-10 NOTE — LETTER
January 10, 2020     Patient: Elisabeth Carney   YOB: 1970   Date of Visit: 1/10/2020       To Whom it May Concern:    Marcial Quiroga is under my professional care  He was seen in my office on 1/10/2020  He may return to work 10/13/19  Please excuse all days missed due to injury  If you have any questions or concerns, please don't hesitate to call           Sincerely,          Iglesia Amanda MD        CC: No Recipients

## 2020-01-16 ENCOUNTER — EVALUATION (OUTPATIENT)
Dept: PHYSICAL THERAPY | Facility: CLINIC | Age: 50
End: 2020-01-16
Payer: COMMERCIAL

## 2020-01-16 DIAGNOSIS — S86.812D STRAIN OF CALF MUSCLE, LEFT, SUBSEQUENT ENCOUNTER: Primary | ICD-10-CM

## 2020-01-16 PROCEDURE — 97140 MANUAL THERAPY 1/> REGIONS: CPT | Performed by: PHYSICAL THERAPIST

## 2020-01-16 PROCEDURE — 97110 THERAPEUTIC EXERCISES: CPT | Performed by: PHYSICAL THERAPIST

## 2020-01-16 PROCEDURE — 97161 PT EVAL LOW COMPLEX 20 MIN: CPT | Performed by: PHYSICAL THERAPIST

## 2020-01-16 NOTE — PROGRESS NOTES
PT Evaluation     Today's date: 2020  Patient name: Samantha Spurling  : 1970  MRN: [id number]  Referring provider: Victorino Kerr MD  Dx:   Encounter Diagnosis     ICD-10-CM    1  Strain of calf muscle, left, subsequent encounter S81 [id number]                   Assessment  Assessment details: Samantha Spurling is a 54 y o  male referred with primary diagnosis of Strain of calf muscle, left, subsequent encounter  (primary encounter diagnosis)   Patient presents with the following functional limitations: Ambulating with walker boot and goes up/down stairs step-to  He demonstrates significant limitations in ankle dorsiflexion due to pain in gastrocnemius  ROM significantly improved after IASTM to medial gastroc and manual gastrocnemius stretching  Treatment to include: Manual therapy techniques, extremity/core strengthening, neuromuscular control exercises, balance/proprioception training as appropriate, instruction in a comprehensive HEP, and modalities as needed  They will benefit from skilled PT services to address the above functional deficits and to decrease pain to promote a return to their premorbid level of function  Impairments: abnormal gait, abnormal or restricted ROM, impaired physical strength, pain with function and weight-bearing intolerance  Functional limitations: Ambulating with walker boot and goes up/down stairs step-to  Understanding of Dx/Px/POC: good  Goals  STG (2 weeks)  1  Patient will demonstrate ankle dorsiflexion to 5 degrees  2  Patient will report calf pain as a 2-3/10 at worst when ambulating without walker boot  LTG (4 weeks)  1  Patient will report pain as a 0-1/10 at worst with normal activity  2  Patient will demonstrate normal gait pattern in community  3  Patient will negotiate stairs reciprocally with HR  4   Patient will demonstrate ankle dorsiflexion = to uninvolved LE to normalize gait      Plan  Patient would benefit from: skilled physical therapy  Planned therapy interventions: manual therapy, neuromuscular re-education, patient education, strengthening, stretching, therapeutic activities, therapeutic exercise and home exercise program  Frequency: 1x week  Duration in weeks: 4  Plan of Care beginning date: 2020  Plan of Care expiration date: 2020  Treatment plan discussed with: patient        Subjective Evaluation    History of Present Illness  Date of onset: 2019  Mechanism of injury: Patient was skateboarding at a park and pushed up on his foot to go up a ramp  He felt a "cramp" in his left calf  Swelling and pain got worse by the end of the night  Patient was seen in the emergency department 2019 for evaluation of his left lower extremity due to pain and swelling  Patient was admitted, had ortho consult, venous Doppler, MRI and CT scan  Diagnosed with intramuscular hematoma  Seen by orthopedics and was placed in a walker boot on 1/10/20  Referred now to outpatient PT services  Quality of life: fair    Pain  Current pain ratin  At best pain ratin  At worst pain ratin  Location: proximal gastroc on left  Quality: dull ache  Progression: improved    Social Support    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[employer] )  Exercise history: Currently ROM exercises of ankle  Likes to hike, ride fat bikes, hiking        Diagnostic Tests  CT scan: abnormal  Treatments  Previous treatment: medication  Current treatment: medication  Patient Goals  Patient goals for therapy: increased strength, increased motion and return to sport/leisure activities          Objective     Active Range of Motion   Left Ankle/Foot   Dorsiflexion (ke): 0 degrees   Dorsiflexion (kf): 0 degrees   Plantar flexion: WFL  Inversion: WFL  Eversion: WFL    Passive Range of Motion   Left Ankle/Foot    Dorsiflexion (ke): 2 degrees   Dorsiflexion (kf): 3 degrees   Plantar flexion: WFL  Inversion: WFL  Eversion: [facility]    Strength/Myotome Testing Left Ankle/Foot   Dorsiflexion: 4+  Plantar flexion: 4-  Inversion: 4+  Eversion: 4+    Ambulation   Weight-Bearing Status   Weight-Bearing Status (Left): weight-bearing as tolerated     Additional Weight-Bearing Status Details  Walker boot    Ambulation: Stairs   Ascend stairs: independent  Pattern: non-reciprocal  Railings: one rail  Descend stairs: independent  Pattern: non-reciprocal  Railings: one rail             Precautions: Anxiety    Manual  1/16            IASTM left medial gastroc JF            Gastroc stretch 3x30"            Soleus stretch NV                                          Exercise Diary  1/16            Nustep             Eccentric gastroc strengthening             Runner's gastroc stretch Instructed            Standing soleus stretch Instructed            SLS left             Step-ups F/L             Eccentric lowering left at step                                                                                                                                                                                          Modalities

## 2020-01-20 ENCOUNTER — APPOINTMENT (OUTPATIENT)
Dept: PHYSICAL THERAPY | Facility: CLINIC | Age: 50
End: 2020-01-20
Payer: COMMERCIAL

## 2020-01-23 ENCOUNTER — OFFICE VISIT (OUTPATIENT)
Dept: PHYSICAL THERAPY | Facility: CLINIC | Age: 50
End: 2020-01-23
Payer: COMMERCIAL

## 2020-01-23 DIAGNOSIS — S86.812D STRAIN OF CALF MUSCLE, LEFT, SUBSEQUENT ENCOUNTER: Primary | ICD-10-CM

## 2020-01-23 DIAGNOSIS — S86.812A STRAIN OF CALF MUSCLE, LEFT, INITIAL ENCOUNTER: ICD-10-CM

## 2020-01-23 PROCEDURE — 97110 THERAPEUTIC EXERCISES: CPT

## 2020-01-23 PROCEDURE — 97140 MANUAL THERAPY 1/> REGIONS: CPT

## 2020-01-23 NOTE — PROGRESS NOTES
Daily Note     Today's date: 2020  Patient name: Mary Crook  : 1970  MRN: [id number]  Referring provider: Van Alvarenga MD  Dx:   Encounter Diagnosis     ICD-10-CM    1  Strain of calf muscle, left, subsequent encounter S86 812D    2  Strain of calf muscle, left, initial encounter S86 812A Ambulatory referral to Physical Therapy       Start Time: 1615  Stop Time: 1700  Total time in clinic (min): 45 minutes    Subjective: Pt reported that he is still wearing the boot at work and is really stiff upon arrival of physical therapy  Pt reported in general without the brace he has a 2-3/10 pain on a normal basis  Objective: See treatment diary below      Assessment:  Continued with treatment session, progressed exercises, pt educated to tell therapist is he has increase in pain with progressions  Overall patient reported feeling better post manuals  Tolerated treatment well  Patient demonstrated fatigue post treatment, exhibited good technique with therapeutic exercises and would benefit from continued PT  Pt educated about DOMS  Plan: Continue per plan of care  Precautions: Anxiety    Manual             IASTM left medial gastroc JF SC           Gastroc stretch 3x30" 3x 30"            Soleus stretch NV 3x 30"                                          Exercise Diary             Nustep  L6 10 min            Eccentric gastroc strengthening  np            Runner's gastroc stretch Instructed np            Standing soleus stretch Instructed np            SLS left  30" x 2            Step-ups F/L  6" 2x 10 ea              Eccentric lowering left at step  2x 10 bilateral support                                                                                                                                                                                         Modalities

## 2020-01-28 ENCOUNTER — OFFICE VISIT (OUTPATIENT)
Dept: OBGYN CLINIC | Facility: CLINIC | Age: 50
End: 2020-01-28
Payer: COMMERCIAL

## 2020-01-28 VITALS
SYSTOLIC BLOOD PRESSURE: 115 MMHG | DIASTOLIC BLOOD PRESSURE: 79 MMHG | WEIGHT: 172 LBS | HEART RATE: 55 BPM | BODY MASS INDEX: 30.48 KG/M2 | HEIGHT: 63 IN

## 2020-01-28 DIAGNOSIS — S86.812D STRAIN OF CALF MUSCLE, LEFT, SUBSEQUENT ENCOUNTER: Primary | ICD-10-CM

## 2020-01-28 PROCEDURE — 99213 OFFICE O/P EST LOW 20 MIN: CPT | Performed by: ORTHOPAEDIC SURGERY

## 2020-01-28 NOTE — PROGRESS NOTES
SUBJECTIVE  Patient is a 54year old male here for a 3 week follow up for his Left calf  He is being treated for a calf strain in a cam boot with wedge lift and using the crutches as needed  He is in formal physical therapy and taking Tylenol as needed  He notes that the swelling and the bruising has resolved  ROS:   General: No fever, no chills, no weight loss, no weight gain  HEENT:  No loss of hearing, no nose bleeds, no sore throat  Eyes:  No eye pain, no red eyes, no visual disturbance  Respiratory:  No cough, no shortness of breath, no wheezing  Cardiovascular:  No chest pain, no palpitations, no edema  GI: No abdominal pain, no nausea, no vomiting  Endocrine: No frequent urination, no excessive thirst  Urinary:  No dysuria, no hematuria, no incontinence  Musculoskeletal: see HPI and PE  Skin:  No rash, no wounds  Neurological:  No dizziness, no headache, no numbness  Psychiatric:  No difficulty concentrating, no depression, no suicide thoughts, no anxiety  Review of all other systems is negative    PMH:  Past Medical History:   Diagnosis Date    Anxiety     Seasonal allergies        PSH:  History reviewed  No pertinent surgical history  Medications:  Current Outpatient Medications   Medication Sig Dispense Refill    traZODone (DESYREL) 50 mg tablet Take 1 tablet (50 mg total) by mouth daily at bedtime 30 tablet 5    acetaminophen (TYLENOL) 325 mg tablet Take 2 tablets (650 mg total) by mouth every 6 (six) hours as needed for mild pain, headaches or fever (Patient not taking: Reported on 1/10/2020) 30 tablet 0     No current facility-administered medications for this visit          Allergies:  No Known Allergies    Family History:  Family History   Problem Relation Age of Onset   Alex Lynn Breast cancer Mother     Hypertension Mother     No Known Problems Father        Social History:  Social History     Occupational History    Not on file   Tobacco Use    Smoking status: Never Smoker    Smokeless tobacco: Never Used    Tobacco comment: Former Smoke as per [place]  Substance and Sexual Activity    Alcohol use: Yes     Frequency: Monthly or less     Comment: socially    Drug use: Never    Sexual activity: Not on file       Physical Exam:  General :  Alert, cooperative, no distress, appears stated age  Blood pressure 115/79, pulse 55, height 5' 3" (1 6 m), weight 78 kg (172 lb)  Head:  Normocephalic, without obvious abnormality, atraumatic   Eyes:  Conjunctiva/corneas clear, EOM's intact,   Ears: Both ears normal appearance, no hearing deficits  Nose: Nares normal, septum midline, no drainage    Neck: Supple,  trachea midline, no adenopathy, no tenderness, no mass   Back:   Symmetric, no curvature, ROM normal, no tenderness   Lungs:   Respirations unlabored   Chest Wall:  No tenderness or deformity   Extremities: Extremities normal, atraumatic, no cyanosis or edema      Pulses: 2+ and symmetric   Skin: Skin color, texture, turgor normal, no rashes or lesions      Neurologic: Normal           Left Ankle Exam     Other   Erythema: absent  Sensation: normal  Pulse: present    Comments:  Swelling has decreased tremendously and has mild tenderness over the medial gastrocnemius  Imaging Studies: The following imaging studies were reviewed in office today  My findings are noted  Assessment  Encounter Diagnosis   Name Primary?     Strain of calf muscle, left, subsequent encounter Yes         Plan:  - He still has mild tenderness along the medial gastrocnemius    - Continue with the PT 1x/week working on his gentle stretching  - Continue with the cam boot for 1 more week  - Follow up in 2 weeks      Scribe Attestation    I,:   Macrina Reza am acting as a scribe while in the presence of the attending physician :        I,:   Iglesia Amanda MD personally performed the services described in this documentation    as scribed in my presence :

## 2020-01-30 ENCOUNTER — OFFICE VISIT (OUTPATIENT)
Dept: PHYSICAL THERAPY | Facility: CLINIC | Age: 50
End: 2020-01-30
Payer: COMMERCIAL

## 2020-01-30 DIAGNOSIS — S86.812D STRAIN OF CALF MUSCLE, LEFT, SUBSEQUENT ENCOUNTER: Primary | ICD-10-CM

## 2020-01-30 PROCEDURE — 97530 THERAPEUTIC ACTIVITIES: CPT | Performed by: PHYSICAL THERAPIST

## 2020-01-30 PROCEDURE — 97140 MANUAL THERAPY 1/> REGIONS: CPT | Performed by: PHYSICAL THERAPIST

## 2020-01-30 PROCEDURE — 97110 THERAPEUTIC EXERCISES: CPT | Performed by: PHYSICAL THERAPIST

## 2020-01-30 NOTE — PROGRESS NOTES
Daily Note     Today's date: 2020  Patient name: Artem Guajardo  : 1970  MRN: [id number]  Referring provider: Esther Sharma MD  Dx:   Encounter Diagnosis     ICD-10-CM    1  Strain of calf muscle, left, subsequent encounter S86 119B                   Subjective: Has been feeling much better  Not having any pain, but ankle feels very tight  Objective: See treatment diary below      Assessment: Tolerated treatment well  Patient would benefit from continued PT  Slight fatigue with TE today  Weakness noted in left gastroc, as well as, difficulty with SLS  Advised patient to perform heel raises and work on SLS at home  Expressed understanding  Plan: Continue per plan of care  Precautions: Anxiety    Manual            IASTM left medial gastroc JF SC JF          Gastroc stretch 3x30" 3x 30"  3x30"          Soleus stretch NV 3x 30"  3x30"                                        Exercise Diary            Nustep  L6 10 min  Upright bike L5x8'          Eccentric gastroc strengthening  np            Runner's gastroc stretch Instructed np            Standing soleus stretch Instructed np            SLS left  30" x 2  Rebounder, green disc, red ball x30          Step-ups F/L  6" 2x 10 ea              Eccentric lowering left at step  2x 10 bilateral support  6" 3x10          TG SL squat   L19 3x10          TG SL heel raise left   L19 3x10          Walking lunges   NV                                                                                                                                                Modalities

## 2020-01-31 ENCOUNTER — OFFICE VISIT (OUTPATIENT)
Dept: FAMILY MEDICINE CLINIC | Facility: CLINIC | Age: 50
End: 2020-01-31
Payer: COMMERCIAL

## 2020-01-31 VITALS
HEART RATE: 64 BPM | BODY MASS INDEX: 30.48 KG/M2 | WEIGHT: 172 LBS | HEIGHT: 63 IN | OXYGEN SATURATION: 98 % | SYSTOLIC BLOOD PRESSURE: 112 MMHG | DIASTOLIC BLOOD PRESSURE: 68 MMHG | TEMPERATURE: 97.1 F

## 2020-01-31 DIAGNOSIS — F51.01 PRIMARY INSOMNIA: ICD-10-CM

## 2020-01-31 DIAGNOSIS — E78.00 HIGH CHOLESTEROL: Primary | ICD-10-CM

## 2020-01-31 PROBLEM — F41.9 ANXIETY: Status: RESOLVED | Noted: 2020-01-03 | Resolved: 2020-01-31

## 2020-01-31 PROCEDURE — 99213 OFFICE O/P EST LOW 20 MIN: CPT | Performed by: FAMILY MEDICINE

## 2020-01-31 NOTE — ASSESSMENT & PLAN NOTE
Patient will work on lifestyle changes for the next 3 months  We discussed low cholesterol diet  Also provided patient instructions  Encouraged increasing physical activity  Will repeat cholesterol 3 months and decide if he needs statin therapy

## 2020-01-31 NOTE — PATIENT INSTRUCTIONS

## 2020-01-31 NOTE — PROGRESS NOTES
Assessment/Plan:       Problem List Items Addressed This Visit        Other    Primary insomnia    High cholesterol - Primary     Patient will work on lifestyle changes for the next 3 months  We discussed low cholesterol diet  Also provided patient instructions  Encouraged increasing physical activity  Will repeat cholesterol 3 months and decide if he needs statin therapy  Relevant Orders    Lipid Panel with Direct LDL reflex            Subjective:      Patient ID: Kae Aguirre is a 54 y o  male  54year old male here to review his cholesterol results  His cholesterol is extremely elevated on recent lab work  He states it does run in his family both of his parents take medication for the cholesterol  Patient denies any chest pain shortness of breath  He does state that his diet is high in cheese and dairy products, he also eats a lot of bread and rice  He eats a fair amount of red meat  His exercise has been reduced due to recently having injury of his calf  He also reports improvement in his sleep on trazodone  He sleeps well through the night  The following portions of the patient's history were reviewed and updated as appropriate:   He  has a past medical history of Anxiety and Seasonal allergies  He   Patient Active Problem List    Diagnosis Date Noted    High cholesterol 01/31/2020    Class 1 obesity due to excess calories without serious comorbidity with body mass index (BMI) of 31 0 to 31 9 in adult 01/03/2020    Primary insomnia 01/03/2020    Hematoma of left lower extremity 12/27/2019     He  has no past surgical history on file  His family history includes Breast cancer in his mother; Hypertension in his mother; No Known Problems in his father  He  reports that he has never smoked  He has never used smokeless tobacco  He reports that he drinks alcohol  He reports that he does not use drugs    Current Outpatient Medications   Medication Sig Dispense Refill    traZODone (DESYREL) 50 mg tablet Take 1 tablet (50 mg total) by mouth daily at bedtime 30 tablet 5     No current facility-administered medications for this visit  Current Outpatient Medications on File Prior to Visit   Medication Sig    traZODone (DESYREL) 50 mg tablet Take 1 tablet (50 mg total) by mouth daily at bedtime    [DISCONTINUED] acetaminophen (TYLENOL) 325 mg tablet Take 2 tablets (650 mg total) by mouth every 6 (six) hours as needed for mild pain, headaches or fever (Patient not taking: Reported on 1/10/2020)     No current facility-administered medications on file prior to visit  He has No Known Allergies       Review of Systems   Constitutional: Negative for activity change, appetite change, fatigue and unexpected weight change  Respiratory: Negative for chest tightness and shortness of breath  Cardiovascular: Negative for chest pain and leg swelling  Gastrointestinal: Negative for abdominal pain  Neurological: Negative for headaches  Objective:      /68   Pulse 64   Temp (!) 97 1 °F (36 2 °C)   Ht 5' 3" (1 6 m)   Wt 78 kg (172 lb)   SpO2 98%   BMI 30 47 kg/m²          Physical Exam   Constitutional: He is oriented to person, place, and time  He appears well-developed and well-nourished  No distress  HENT:   Head: Normocephalic and atraumatic  Cardiovascular: Normal rate, regular rhythm and normal heart sounds  Exam reveals no gallop and no friction rub  No murmur heard  Pulmonary/Chest: Effort normal and breath sounds normal  No respiratory distress  He has no wheezes  He has no rales  He exhibits no tenderness  Musculoskeletal: He exhibits no edema  Neurological: He is alert and oriented to person, place, and time  Skin: He is not diaphoretic  Psychiatric: He has a normal mood and affect  His behavior is normal  Judgment and thought content normal    Nursing note and vitals reviewed

## 2020-02-06 ENCOUNTER — OFFICE VISIT (OUTPATIENT)
Dept: PHYSICAL THERAPY | Facility: CLINIC | Age: 50
End: 2020-02-06
Payer: COMMERCIAL

## 2020-02-06 DIAGNOSIS — S86.812D STRAIN OF CALF MUSCLE, LEFT, SUBSEQUENT ENCOUNTER: Primary | ICD-10-CM

## 2020-02-06 DIAGNOSIS — S86.812A STRAIN OF CALF MUSCLE, LEFT, INITIAL ENCOUNTER: ICD-10-CM

## 2020-02-06 PROCEDURE — 97140 MANUAL THERAPY 1/> REGIONS: CPT | Performed by: PHYSICAL THERAPIST

## 2020-02-06 PROCEDURE — 97110 THERAPEUTIC EXERCISES: CPT | Performed by: PHYSICAL THERAPIST

## 2020-02-06 NOTE — PROGRESS NOTES
Daily Note     Today's date: 2020  Patient name: Claudia Victor  : 1970  MRN: [id number]  Referring provider: Marcille Hamman, MD  Dx:   Encounter Diagnosis     ICD-10-CM    1  Strain of calf muscle, left, subsequent encounter S86 812D    2  Strain of calf muscle, left, initial encounter S86 812A                   Subjective: Feeling much better  Feels some stiffness in his calf, but not having pain  Is now out of his boot all the time  Feels he is ready for DC to a HEP  Objective: See treatment diary below      Assessment: Tolerated treatment well  Patient demonstrated AROM dorsiflexion to 15 degrees and strength 4+/5  No pain and good balance  No further need for skilled PT services at this time  Plan: Discharge due to all goals met  Precautions: Anxiety    Manual           IASTM left medial gastroc JF SC JF JF         Gastroc stretch 3x30" 3x 30"  3x30" 3x30"         Soleus stretch NV 3x 30"  3x30" 3x30"                                       Exercise Diary   2         Nustep  L6 10 min  Upright bike L5x8' L5x8'         Eccentric gastroc strengthening  np   Reviewed         Runner's gastroc stretch Instructed np   Reviewed         Standing soleus stretch Instructed np   Reviewed         SLS left  30" x 2  Rebounder, green disc, red ball x30          Step-ups F/L  6" 2x 10 ea              Eccentric lowering left at step  2x 10 bilateral support  6" 3x10 Reviewed         TG SL squat   L19 3x10          TG SL heel raise left   L19 3x10          Walking lunges   NV                                                                                                                                                Modalities

## 2020-02-14 ENCOUNTER — OFFICE VISIT (OUTPATIENT)
Dept: OBGYN CLINIC | Facility: CLINIC | Age: 50
End: 2020-02-14
Payer: COMMERCIAL

## 2020-02-14 VITALS
HEIGHT: 63 IN | WEIGHT: 171.6 LBS | SYSTOLIC BLOOD PRESSURE: 107 MMHG | RESPIRATION RATE: 20 BRPM | BODY MASS INDEX: 30.41 KG/M2 | HEART RATE: 60 BPM | DIASTOLIC BLOOD PRESSURE: 71 MMHG

## 2020-02-14 DIAGNOSIS — S80.12XD HEMATOMA OF LEFT LOWER EXTREMITY, SUBSEQUENT ENCOUNTER: Primary | ICD-10-CM

## 2020-02-14 PROCEDURE — 99213 OFFICE O/P EST LOW 20 MIN: CPT | Performed by: ORTHOPAEDIC SURGERY

## 2020-02-14 PROCEDURE — 3008F BODY MASS INDEX DOCD: CPT | Performed by: ORTHOPAEDIC SURGERY

## 2020-02-14 PROCEDURE — 1036F TOBACCO NON-USER: CPT | Performed by: ORTHOPAEDIC SURGERY

## 2020-02-14 NOTE — PROGRESS NOTES
Chief Complaint   Patient presents with    Left Lower Leg - Follow-up         Subjective   Patient here following up for his left calf injury  Left calf injury he sustained on December 27, 2019  He was stepping off and heard a pop and had some swelling in the left calf  Patient was diagnosed with that hematoma  He was immobilized in a Cam boot and slowly get better over time  He has been doing physical therapy and was just discharged last week  Today he enters feeling much improved  He has no complaints of pain  He has nearly full range of motion  Still has some weakness but he is working on that  ROS:  Review of systems form reviewed February 14, 2020  General: no fever, no chills  Respiratory:  No coughing, shortness of breath or wheezing  Cardiovascular:  No chest pain, no palpitations  Musculoskeletal: see HPI and PE  SKIN:  No skin rash, no dry skin  Neurological:  No headaches, no confusion  Psychiatric:  No suicide thoughts, no anxiety, no depression  Review of all other systems is negative    Past Medical History:   Diagnosis Date    Anxiety     Seasonal allergies        Current Outpatient Medications on File Prior to Visit   Medication Sig Dispense Refill    traZODone (DESYREL) 50 mg tablet Take 1 tablet (50 mg total) by mouth daily at bedtime 30 tablet 5     No current facility-administered medications on file prior to visit          No Known Allergies      Physical Exam:    Vitals:    02/14/20 1501   BP: 107/71   Pulse: 60   Resp: 20   Weight: 77 8 kg (171 lb 9 6 oz)   Height: 5' 3" (1 6 m)       General Appearance:  Alert, cooperative, no distress, appears stated age   Lungs:   respirations unlabored   Heart:  Normal heart rate noted   Abdomen:   Soft, non-tender,  no masses   Extremities: Extremities normal, atraumatic, no cyanosis or edema   Pulses: 2+ and symmetric   Skin: Skin color, texture, turgor normal, no rashes or lesions   Neurologic: Normal         Ortho Exam  Left lower leg examination shows skin intact no erythema  Calf is soft and nontender  Swelling of the mid calf has decreased from prior examination  Left knee with full active range of motion  Left ankle with full plantar flexion and dorsiflexion to 10°  No tenderness with palpation along the calf    ASSESSMENT:    Martha Lutz was seen today for follow-up  Diagnoses and all orders for this visit:    Hematoma of left lower extremity, subsequent encounter          PLAN:  Patient improved from left calf hematoma which has resolved  Patient will continue working on his home exercise program to build up strength  He may resume all activities with no restrictions  Patient will warm up and stretch before any activity    Follow up with us as needed    Scribe Attestation    I,:   Allyson Uriarte PA-C am acting as a scribe while in the presence of the attending physician :        I,:   Mj Infante MD personally performed the services described in this documentation    as scribed in my presence :

## 2020-02-23 ENCOUNTER — OFFICE VISIT (OUTPATIENT)
Dept: URGENT CARE | Facility: CLINIC | Age: 50
End: 2020-02-23
Payer: COMMERCIAL

## 2020-02-23 VITALS
OXYGEN SATURATION: 99 % | RESPIRATION RATE: 16 BRPM | HEIGHT: 63 IN | DIASTOLIC BLOOD PRESSURE: 78 MMHG | HEART RATE: 68 BPM | WEIGHT: 172 LBS | TEMPERATURE: 98.3 F | SYSTOLIC BLOOD PRESSURE: 112 MMHG | BODY MASS INDEX: 30.48 KG/M2

## 2020-02-23 DIAGNOSIS — J01.90 ACUTE SINUSITIS, RECURRENCE NOT SPECIFIED, UNSPECIFIED LOCATION: Primary | ICD-10-CM

## 2020-02-23 PROCEDURE — 99213 OFFICE O/P EST LOW 20 MIN: CPT | Performed by: PHYSICIAN ASSISTANT

## 2020-02-23 RX ORDER — AMOXICILLIN AND CLAVULANATE POTASSIUM 875; 125 MG/1; MG/1
1 TABLET, FILM COATED ORAL EVERY 12 HOURS SCHEDULED
Qty: 20 TABLET | Refills: 0 | Status: SHIPPED | OUTPATIENT
Start: 2020-02-23 | End: 2020-03-04

## 2020-02-23 NOTE — PROGRESS NOTES
[facility]        NAME: Elisabeth Carney is a 54 y o  male  : 1970    MRN: [id number]  DATE: 2020  TIME: 8:30 AM    Assessment and Plan   Acute sinusitis, recurrence not specified, unspecified location [J01 90]  1  Acute sinusitis, recurrence not specified, unspecified location  amoxicillin-clavulanate (AUGMENTIN) 875-125 mg per tablet     Mucinex OTC and Flonase as directed     Patient Instructions     Follow up with PCP in 3-5 days  Proceed to  ER if symptoms worsen  Chief Complaint     Chief Complaint   Patient presents with    Cold Like Symptoms     started yesterday, c/o cough, b/l ear pain, headache, and sore throat  No fevers         History of Present Illness       72-year-old male presents for evaluation cough, ear pain headache and sore throat  Patient states symptoms have been ongoing approximately 2 days  Patient complains of sinus pain and postnasal drip  Patient did not receive flu vaccine this year  He denies fever chills  Denies recent travel  Review of Systems   Review of Systems   Constitutional: Negative for chills, fatigue and fever  HENT: Negative for congestion, ear pain, sinus pain, sore throat and trouble swallowing  Eyes: Negative for pain, discharge and redness  Respiratory: Negative for cough, chest tightness, shortness of breath and wheezing  Cardiovascular: Negative for chest pain, palpitations and leg swelling  Gastrointestinal: Negative for abdominal pain, diarrhea, nausea and vomiting  Musculoskeletal: Negative for arthralgias, joint swelling and myalgias  Skin: Negative for rash  Neurological: Negative for dizziness, weakness, numbness and headaches           Current Medications       Current Outpatient Medications:     traZODone (DESYREL) 50 mg tablet, Take 1 tablet (50 mg total) by mouth daily at bedtime, Disp: 30 tablet, Rfl: 5    amoxicillin-clavulanate (AUGMENTIN) 875-125 mg per tablet, Take 1 tablet by mouth every 12 (twelve) hours for 10 days, Disp: 20 tablet, Rfl: 0    Current Allergies     Allergies as of 02/23/2020    (No Known Allergies)            The following portions of the patient's history were reviewed and updated as appropriate: allergies, current medications, past family history, past medical history, past social history, past surgical history and problem list      Past Medical History:   Diagnosis Date    Anxiety     Seasonal allergies        History reviewed  No pertinent surgical history  Family History   Problem Relation Age of Onset   Danika Skelton Breast cancer Mother     Hypertension Mother     No Known Problems Father          Medications have been verified  Objective   /78   Pulse 68   Temp 98 3 °F (36 8 °C) (Oral)   Resp 16   Ht 5' 3" (1 6 m)   Wt 78 kg (172 lb)   SpO2 99%   BMI 30 47 kg/m²        Physical Exam     Physical Exam   Constitutional: He appears well-developed and well-nourished  HENT:   Head: Normocephalic  Right Ear: Hearing and tympanic membrane normal    Left Ear: Hearing and tympanic membrane normal    Nose: No mucosal edema  Mouth/Throat: Uvula is midline and mucous membranes are normal  Posterior oropharyngeal erythema present  Cardiovascular: Normal rate and regular rhythm  Pulmonary/Chest: Effort normal and breath sounds normal    Nursing note and vitals reviewed

## 2020-03-19 DIAGNOSIS — F41.9 ANXIETY: ICD-10-CM

## 2020-03-19 DIAGNOSIS — F51.01 PRIMARY INSOMNIA: ICD-10-CM

## 2020-03-19 RX ORDER — TRAZODONE HYDROCHLORIDE 50 MG/1
50 TABLET ORAL
Qty: 90 TABLET | Refills: 0 | Status: SHIPPED | OUTPATIENT
Start: 2020-03-19 | End: 2020-08-04

## 2020-08-03 DIAGNOSIS — F51.01 PRIMARY INSOMNIA: ICD-10-CM

## 2020-08-03 DIAGNOSIS — F41.9 ANXIETY: ICD-10-CM

## 2020-08-04 DIAGNOSIS — F51.01 PRIMARY INSOMNIA: ICD-10-CM

## 2020-08-04 DIAGNOSIS — F41.9 ANXIETY: ICD-10-CM

## 2020-08-04 RX ORDER — TRAZODONE HYDROCHLORIDE 50 MG/1
50 TABLET ORAL
Qty: 90 TABLET | Refills: 3 | Status: SHIPPED | OUTPATIENT
Start: 2020-08-04 | End: 2020-08-07

## 2020-08-04 RX ORDER — TRAZODONE HYDROCHLORIDE 50 MG/1
TABLET ORAL
Qty: 90 TABLET | Refills: 3 | Status: SHIPPED | OUTPATIENT
Start: 2020-08-04 | End: 2020-08-04 | Stop reason: SDUPTHER

## 2020-08-07 DIAGNOSIS — F41.9 ANXIETY: ICD-10-CM

## 2020-08-07 DIAGNOSIS — F51.01 PRIMARY INSOMNIA: ICD-10-CM

## 2020-08-07 RX ORDER — TRAZODONE HYDROCHLORIDE 50 MG/1
TABLET ORAL
Qty: 90 TABLET | Refills: 3 | Status: SHIPPED | OUTPATIENT
Start: 2020-08-07 | End: 2021-11-18 | Stop reason: SDUPTHER

## 2020-11-13 ENCOUNTER — APPOINTMENT (OUTPATIENT)
Dept: LAB | Facility: CLINIC | Age: 50
End: 2020-11-13
Payer: COMMERCIAL

## 2020-11-13 ENCOUNTER — OFFICE VISIT (OUTPATIENT)
Dept: FAMILY MEDICINE CLINIC | Facility: CLINIC | Age: 50
End: 2020-11-13
Payer: COMMERCIAL

## 2020-11-13 VITALS
BODY MASS INDEX: 28.77 KG/M2 | OXYGEN SATURATION: 99 % | DIASTOLIC BLOOD PRESSURE: 76 MMHG | HEART RATE: 64 BPM | WEIGHT: 162.4 LBS | SYSTOLIC BLOOD PRESSURE: 121 MMHG | TEMPERATURE: 96.7 F

## 2020-11-13 DIAGNOSIS — M77.11 LATERAL EPICONDYLITIS OF RIGHT ELBOW: ICD-10-CM

## 2020-11-13 DIAGNOSIS — H61.23 BILATERAL IMPACTED CERUMEN: ICD-10-CM

## 2020-11-13 DIAGNOSIS — H65.93 MIDDLE EAR EFFUSION, BILATERAL: ICD-10-CM

## 2020-11-13 DIAGNOSIS — H66.003 NON-RECURRENT ACUTE SUPPURATIVE OTITIS MEDIA OF BOTH EARS WITHOUT SPONTANEOUS RUPTURE OF TYMPANIC MEMBRANES: Primary | ICD-10-CM

## 2020-11-13 DIAGNOSIS — E78.00 HIGH CHOLESTEROL: ICD-10-CM

## 2020-11-13 LAB
CHOLEST SERPL-MCNC: 246 MG/DL (ref 50–200)
HDLC SERPL-MCNC: 47 MG/DL
LDLC SERPL CALC-MCNC: 166 MG/DL (ref 0–100)
TRIGL SERPL-MCNC: 163 MG/DL

## 2020-11-13 PROCEDURE — 1036F TOBACCO NON-USER: CPT | Performed by: PHYSICIAN ASSISTANT

## 2020-11-13 PROCEDURE — 99214 OFFICE O/P EST MOD 30 MIN: CPT | Performed by: PHYSICIAN ASSISTANT

## 2020-11-13 PROCEDURE — 80061 LIPID PANEL: CPT

## 2020-11-13 PROCEDURE — 36415 COLL VENOUS BLD VENIPUNCTURE: CPT

## 2020-11-13 PROCEDURE — 69209 REMOVE IMPACTED EAR WAX UNI: CPT | Performed by: PHYSICIAN ASSISTANT

## 2020-11-13 RX ORDER — AMOXICILLIN AND CLAVULANATE POTASSIUM 875; 125 MG/1; MG/1
1 TABLET, FILM COATED ORAL EVERY 12 HOURS SCHEDULED
Qty: 14 TABLET | Refills: 0 | Status: SHIPPED | OUTPATIENT
Start: 2020-11-13 | End: 2020-11-20

## 2020-11-13 RX ORDER — FLUTICASONE PROPIONATE 50 MCG
1 SPRAY, SUSPENSION (ML) NASAL DAILY
Qty: 1 BOTTLE | Refills: 0 | Status: SHIPPED | OUTPATIENT
Start: 2020-11-13 | End: 2022-01-13 | Stop reason: SDUPTHER

## 2020-12-14 ENCOUNTER — OFFICE VISIT (OUTPATIENT)
Dept: FAMILY MEDICINE CLINIC | Facility: CLINIC | Age: 50
End: 2020-12-14
Payer: COMMERCIAL

## 2020-12-14 VITALS
TEMPERATURE: 98.5 F | OXYGEN SATURATION: 97 % | DIASTOLIC BLOOD PRESSURE: 68 MMHG | HEART RATE: 64 BPM | WEIGHT: 160 LBS | BODY MASS INDEX: 28.35 KG/M2 | HEIGHT: 63 IN | SYSTOLIC BLOOD PRESSURE: 124 MMHG

## 2020-12-14 DIAGNOSIS — E78.00 HIGH CHOLESTEROL: ICD-10-CM

## 2020-12-14 DIAGNOSIS — Z23 INFLUENZA VACCINE NEEDED: ICD-10-CM

## 2020-12-14 DIAGNOSIS — Z00.00 ANNUAL PHYSICAL EXAM: Primary | ICD-10-CM

## 2020-12-14 DIAGNOSIS — Z23 NEED FOR TDAP VACCINATION: ICD-10-CM

## 2020-12-14 DIAGNOSIS — M77.11 RIGHT LATERAL EPICONDYLITIS: ICD-10-CM

## 2020-12-14 PROCEDURE — 1036F TOBACCO NON-USER: CPT | Performed by: FAMILY MEDICINE

## 2020-12-14 PROCEDURE — 3725F SCREEN DEPRESSION PERFORMED: CPT | Performed by: FAMILY MEDICINE

## 2020-12-14 PROCEDURE — 90472 IMMUNIZATION ADMIN EACH ADD: CPT | Performed by: FAMILY MEDICINE

## 2020-12-14 PROCEDURE — 99396 PREV VISIT EST AGE 40-64: CPT | Performed by: FAMILY MEDICINE

## 2020-12-14 PROCEDURE — 90682 RIV4 VACC RECOMBINANT DNA IM: CPT | Performed by: FAMILY MEDICINE

## 2020-12-14 PROCEDURE — 3008F BODY MASS INDEX DOCD: CPT | Performed by: FAMILY MEDICINE

## 2020-12-14 PROCEDURE — 90715 TDAP VACCINE 7 YRS/> IM: CPT | Performed by: FAMILY MEDICINE

## 2020-12-14 PROCEDURE — 90471 IMMUNIZATION ADMIN: CPT | Performed by: FAMILY MEDICINE

## 2020-12-23 ENCOUNTER — APPOINTMENT (OUTPATIENT)
Dept: RADIOLOGY | Facility: CLINIC | Age: 50
End: 2020-12-23
Payer: COMMERCIAL

## 2020-12-23 ENCOUNTER — OFFICE VISIT (OUTPATIENT)
Dept: OBGYN CLINIC | Facility: CLINIC | Age: 50
End: 2020-12-23
Payer: COMMERCIAL

## 2020-12-23 VITALS
DIASTOLIC BLOOD PRESSURE: 75 MMHG | WEIGHT: 160 LBS | HEIGHT: 63 IN | BODY MASS INDEX: 28.35 KG/M2 | HEART RATE: 64 BPM | SYSTOLIC BLOOD PRESSURE: 114 MMHG

## 2020-12-23 DIAGNOSIS — M77.11 RIGHT LATERAL EPICONDYLITIS: ICD-10-CM

## 2020-12-23 DIAGNOSIS — M75.41 SUBACROMIAL IMPINGEMENT OF RIGHT SHOULDER: ICD-10-CM

## 2020-12-23 DIAGNOSIS — M75.101 ROTATOR CUFF SYNDROME OF BOTH SHOULDERS: ICD-10-CM

## 2020-12-23 DIAGNOSIS — M62.838 TRAPEZIUS MUSCLE SPASM: ICD-10-CM

## 2020-12-23 DIAGNOSIS — M19.011 ARTHRITIS OF RIGHT ACROMIOCLAVICULAR JOINT: Primary | ICD-10-CM

## 2020-12-23 DIAGNOSIS — M75.102 ROTATOR CUFF SYNDROME OF BOTH SHOULDERS: ICD-10-CM

## 2020-12-23 PROCEDURE — 20610 DRAIN/INJ JOINT/BURSA W/O US: CPT | Performed by: FAMILY MEDICINE

## 2020-12-23 PROCEDURE — 3008F BODY MASS INDEX DOCD: CPT | Performed by: PHYSICIAN ASSISTANT

## 2020-12-23 PROCEDURE — 73030 X-RAY EXAM OF SHOULDER: CPT

## 2020-12-23 PROCEDURE — 99214 OFFICE O/P EST MOD 30 MIN: CPT | Performed by: FAMILY MEDICINE

## 2020-12-23 RX ORDER — LIDOCAINE HYDROCHLORIDE 10 MG/ML
4 INJECTION, SOLUTION INFILTRATION; PERINEURAL
Status: COMPLETED | OUTPATIENT
Start: 2020-12-23 | End: 2020-12-23

## 2020-12-23 RX ORDER — LIDOCAINE HYDROCHLORIDE 10 MG/ML
3 INJECTION, SOLUTION INFILTRATION; PERINEURAL
Status: COMPLETED | OUTPATIENT
Start: 2020-12-23 | End: 2020-12-23

## 2020-12-23 RX ORDER — TRIAMCINOLONE ACETONIDE 40 MG/ML
40 INJECTION, SUSPENSION INTRA-ARTICULAR; INTRAMUSCULAR
Status: COMPLETED | OUTPATIENT
Start: 2020-12-23 | End: 2020-12-23

## 2020-12-23 RX ADMIN — TRIAMCINOLONE ACETONIDE 40 MG: 40 INJECTION, SUSPENSION INTRA-ARTICULAR; INTRAMUSCULAR at 16:33

## 2020-12-23 RX ADMIN — LIDOCAINE HYDROCHLORIDE 3 ML: 10 INJECTION, SOLUTION INFILTRATION; PERINEURAL at 16:33

## 2020-12-23 RX ADMIN — LIDOCAINE HYDROCHLORIDE 4 ML: 10 INJECTION, SOLUTION INFILTRATION; PERINEURAL at 16:33

## 2020-12-29 ENCOUNTER — TELEMEDICINE (OUTPATIENT)
Dept: FAMILY MEDICINE CLINIC | Facility: CLINIC | Age: 50
End: 2020-12-29
Payer: COMMERCIAL

## 2020-12-29 DIAGNOSIS — Z20.822 EXPOSURE TO COVID-19 VIRUS: Primary | ICD-10-CM

## 2020-12-29 DIAGNOSIS — Z20.822 EXPOSURE TO COVID-19 VIRUS: ICD-10-CM

## 2020-12-29 PROCEDURE — 1036F TOBACCO NON-USER: CPT | Performed by: PHYSICIAN ASSISTANT

## 2020-12-29 PROCEDURE — U0003 INFECTIOUS AGENT DETECTION BY NUCLEIC ACID (DNA OR RNA); SEVERE ACUTE RESPIRATORY SYNDROME CORONAVIRUS 2 (SARS-COV-2) (CORONAVIRUS DISEASE [COVID-19]), AMPLIFIED PROBE TECHNIQUE, MAKING USE OF HIGH THROUGHPUT TECHNOLOGIES AS DESCRIBED BY CMS-2020-01-R: HCPCS | Performed by: PHYSICIAN ASSISTANT

## 2020-12-29 PROCEDURE — 99214 OFFICE O/P EST MOD 30 MIN: CPT | Performed by: PHYSICIAN ASSISTANT

## 2020-12-30 LAB — SARS-COV-2 RNA SPEC QL NAA+PROBE: NOT DETECTED

## 2021-01-04 ENCOUNTER — EVALUATION (OUTPATIENT)
Dept: PHYSICAL THERAPY | Facility: CLINIC | Age: 51
End: 2021-01-04
Payer: COMMERCIAL

## 2021-01-04 DIAGNOSIS — M75.41 SUBACROMIAL IMPINGEMENT OF RIGHT SHOULDER: ICD-10-CM

## 2021-01-04 DIAGNOSIS — M75.102 ROTATOR CUFF SYNDROME OF BOTH SHOULDERS: ICD-10-CM

## 2021-01-04 DIAGNOSIS — M62.838 TRAPEZIUS MUSCLE SPASM: ICD-10-CM

## 2021-01-04 DIAGNOSIS — M75.101 ROTATOR CUFF SYNDROME OF BOTH SHOULDERS: ICD-10-CM

## 2021-01-04 DIAGNOSIS — M19.011 ARTHRITIS OF RIGHT ACROMIOCLAVICULAR JOINT: ICD-10-CM

## 2021-01-04 PROBLEM — M77.11 RIGHT LATERAL EPICONDYLITIS: Status: ACTIVE | Noted: 2021-01-04

## 2021-01-04 PROCEDURE — 97161 PT EVAL LOW COMPLEX 20 MIN: CPT | Performed by: PHYSICAL THERAPIST

## 2021-01-04 PROCEDURE — 97110 THERAPEUTIC EXERCISES: CPT | Performed by: PHYSICAL THERAPIST

## 2021-01-04 NOTE — PROGRESS NOTES
PT Evaluation     Today's date: 2021  Patient name: Carlos Pham  : 1970  MRN: [id number]  Referring provider: Lavern Pascal DO  Dx:   Encounter Diagnosis     ICD-10-CM    1  Subacromial impingement of right shoulder  M75 41 Ambulatory referral to Physical Therapy   2  Arthritis of right acromioclavicular joint  M19 011 Ambulatory referral to Physical Therapy   3  Rotator cuff syndrome of both shoulders  M75 101 Ambulatory referral to Physical Therapy    M75 102    4  Trapezius muscle spasm  M62 838 Ambulatory referral to Physical Therapy                  Assessment  Assessment details: Carlos Pham is a pleasant 52 y o  presenting to physical therapy with MD referral for Subacromial impingement of right shoulder, Arthritis of right acromioclavicular joint, Rotator cuff syndrome of both shoulders, and Trapezius muscle spasm  Problem list: Poor posture, decreased upper extremity strength, shoulder A/AA/PROM, poor scapulohumeral rhythm, and increased muscle tension  Treatment to include: Manual therapy techniques, A/AA/PROM, postural re-education, RTC/periscapular strengthening, instruction in a comprehensive HEP, and modalities as needed  This pt would benefit from skilled PT services to address their impairments and functional limitations to maximize functional outcome  Symptom irritability: moderateBarriers to therapy: Chronicity of symptoms  Understanding of Dx/Px/POC: good   Prognosis: good    Goals  ST  Pt will improve shoulder abduction to at least 150 degrees in 3 weeks  2  Pt will improve functional shoulder IR to at least T8 in 3 weeks  LT  Pt will be able to reach into overhead cabinets with minimal to no pain in 6 weeks  2  Pt will be independent in a comprehensive HEP in 6 weeks      Plan  Patient would benefit from: skilled physical therapy  Frequency: 2x week  Duration in weeks: 6  Treatment plan discussed with: patient        Subjective Evaluation    History of Present Illness  Mechanism of injury: Pt reports onset of right elbow pain 2 5 months ago when reaching above eye level getting his coffee  Pt reports when reaching to top shelves, he then noticed pain onset in his right shoulder  Pt saw PCP who referred pt to ortho  Pt saw Dr Corine Lund who took x-rays of shoulder  Pt was provided a cortisone injection in his shoulder which improved his shoulder motion and reduced the popping sensations he was experiencing  Pt reports since the injection in his shoulder, his elbow pain has also reduced  Pt reports he is using voltaran gel on his shoulder and has been using CBD balm on his elbow  Pt reports painful clicking and popping with overhead motion       Premorbid status:  - ADLs: Independent with no difficulty  - Work: Full time, 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    Current status:  - ADLs/Functional activities:     - Reaching into shoulder height cabinets with Pain Levels: no pain   - Reaching into overhead cabinets with Pain Levels: moderate/severe pain   - Washing lower back/tucking in shirt with Pain Levels: moderate pain   - Washing hair with Pain Levels: mild pain   - Driving with Pain Levels: no pain   - Lifting- limiting due to shoulder pain   - Carrying - limiting due to shoulder pain   - Sleeping with 1-2 nightly sleep disturbances due to pain in shoulder   - Mountain biking with minimal pain  - Work: Full time, Full duty  - Recreation: mountain biking, hiking  Pain  Current pain ratin  At best pain ratin  At worst pain ratin  Location: Posterior shoulder  Quality: sharp  Relieving factors: medications and ice  Aggravating factors: overhead activity  Progression: improved      Diagnostic Tests  X-ray: abnormal  Treatments  Previous treatment: injection treatment and medication  Current treatment: medication and physical therapy  Patient Goals  Patient goals for therapy: decreased pain, increased motion, increased strength and independence with ADLs/IADLs          Objective     Postural Observations  Seated posture: fair    Additional Postural Observation Details  Moderate forward rounded shoulders and forward head posture    Palpation     Right   No palpable tenderness to the upper trapezius  Tenderness of the biceps  Active Range of Motion   Left Shoulder   Flexion: 155 degrees   Abduction: 160 degrees   External rotation BTH: T5   Internal rotation BTB: T8     Right Shoulder   Flexion: 135 degrees with pain  Abduction: 92 degrees with pain  External rotation BTH: T2 with pain  Internal rotation BTB: T11 with pain    Additional Active Range of Motion Details  Medial scapula border winging on RUE on return from fully flexed and abducted positions    Passive Range of Motion     Right Shoulder   Flexion: 142 degrees   Abduction: 165 degrees   External rotation 90°: 100 degrees   Internal rotation 90°: 30 degrees     Joint Play     Right Shoulder  Hypomobile in the posterior capsule and inferior capsule  Strength/Myotome Testing     Left Shoulder     Planes of Motion   Flexion: 5   Abduction: 4   External rotation at 0°: 4+   Internal rotation at 0°: 4+     Right Shoulder     Planes of Motion   Flexion: 4+   Abduction: 4   External rotation at 0°: 4+   Internal rotation at 0°: 4+     Left Elbow   Flexion: 4+  Extension: 4+    Right Elbow   Flexion: 5  Extension: 5    Left Wrist/Hand   Wrist extension: 5  Wrist flexion: 5    Right Wrist/Hand   Wrist extension: 5  Wrist flexion: 5    Tests     Left Shoulder   Positive belly press  Negative empty can, full can and Hawkin's  Right Shoulder   Positive belly press, empty can, full can, Hawkin's and Neer's  Negative external rotation lag sign         Flowsheet Rows      Most Recent Value   PT/OT G-Codes   Current Score  55   Projected Score  75             Precautions: none    Manuals 1-4 (IE)            Posterior and inferior GHJ mobs NV MET cross body stretch NV            IR stretch at 90  NV                         Neuro Re-Ed             Cervical retraction 10 x 10" NV            Scapular retraction 10 x 10" NV                                                                             Ther Ex             UBE ant/post standing 3'/3' L1 NV                         Corner stretch 4 x 30" NV            Standing:             - cross body stretch 4 x 30" NV            - sleeper stretch 4 x 30" NV                                      Supine:             - cane AAROM flexion 10 x 5" NV            - cane AAROM scaption 10 x 5" NV                         Sidelying:             - flexion 2 x 10 NV            - abduction 2 x 10 NV            - ER (towel under arm) 2 x 10 NV                         Prone:             - I's             - T's             - Y's                          Ther Activity                                       Gait Training                                       Modalities             Cryo as needed                            * On initial evaluation, educated pt on anatomy, pathology, and exercise rationale  Provided pt with basic HEP and ensured proper exercise performance  Educated pt to call with any questions or concerns  Access Code: [facility]   URL: [email or web address]   Date: 01/04/2021   Prepared by: Anuradha Snare      Exercises  · Corner Pec Major Stretch - 4 reps - 30 seconds hold - 3x daily  · Standing Scapular Retraction - 10 reps - 10 seconds hold - 3x daily  · Neck Retraction - 10 reps - 10 seconds hold - 3x daily

## 2021-01-12 ENCOUNTER — OFFICE VISIT (OUTPATIENT)
Dept: PHYSICAL THERAPY | Facility: CLINIC | Age: 51
End: 2021-01-12
Payer: COMMERCIAL

## 2021-01-12 DIAGNOSIS — M75.41 SUBACROMIAL IMPINGEMENT OF RIGHT SHOULDER: Primary | ICD-10-CM

## 2021-01-12 DIAGNOSIS — M62.838 TRAPEZIUS MUSCLE SPASM: ICD-10-CM

## 2021-01-12 DIAGNOSIS — M75.102 ROTATOR CUFF SYNDROME OF BOTH SHOULDERS: ICD-10-CM

## 2021-01-12 DIAGNOSIS — M75.101 ROTATOR CUFF SYNDROME OF BOTH SHOULDERS: ICD-10-CM

## 2021-01-12 DIAGNOSIS — M19.011 ARTHRITIS OF RIGHT ACROMIOCLAVICULAR JOINT: ICD-10-CM

## 2021-01-12 PROCEDURE — 97140 MANUAL THERAPY 1/> REGIONS: CPT | Performed by: PHYSICAL THERAPIST

## 2021-01-12 PROCEDURE — 97110 THERAPEUTIC EXERCISES: CPT | Performed by: PHYSICAL THERAPIST

## 2021-01-12 NOTE — PROGRESS NOTES
Daily Note     Today's date: 2021  Patient name: Ania Ambrose  : 1970  MRN: [id number]  Referring provider: Barb Contreras DO  Dx:   Encounter Diagnosis     ICD-10-CM    1  Subacromial impingement of right shoulder  M75 41    2  Arthritis of right acromioclavicular joint  M19 011    3  Rotator cuff syndrome of both shoulders  M75 101     M75 102    4  Trapezius muscle spasm  M62 838        Start Time: 1525  Stop Time: 1615  Total time in clinic (min): 50 minutes    Subjective: Patient reports no significant changes to overall status since previous treatment session (IE)  Objective: See treatment diary below      Assessment: Tolerated treatment well  Patient demonstrated fatigue post treatment and would benefit from continued PT  Patient with painful right shoulder abduction and flexion and end range  Patient reported improved mobility and slightly less pain at end of session  Plan: Continue per plan of care  Progress treatment as tolerated         Precautions: none    Manuals 1-4 (IE)            Posterior and inferior GHJ mobs NV GR           MET cross body stretch NV GR           IR stretch at 90  NV GR                        Neuro Re-Ed             Cervical retraction 10 x 10" NV Reviewed           Scapular retraction 10 x 10" NV Reviewed                                                                            Ther Ex             UBE ant/post standing 3'/3' L1 NV 3' / 3' L3 8                        Corner stretch 4 x 30" NV 4x30"           Standing:             - cross body stretch 4 x 30" NV 10x10"           - sleeper stretch 4 x 30" NV 10x10"                                     Supine:             - cane AAROM flexion 10 x 5" NV Opp  UE  10x5"           - cane AAROM scaption 10 x 5" NV                         Sidelying:             - flexion 2 x 10 NV 2x10           - abduction 2 x 10 NV 2x10           - ER (towel under arm) 2 x 10 NV 2x10                        Prone: - I's             - T's             - Y's                          Ther Activity                                       Gait Training                                       Modalities             Cryo as needed

## 2021-01-13 ENCOUNTER — OFFICE VISIT (OUTPATIENT)
Dept: PHYSICAL THERAPY | Facility: CLINIC | Age: 51
End: 2021-01-13
Payer: COMMERCIAL

## 2021-01-13 DIAGNOSIS — M75.41 SUBACROMIAL IMPINGEMENT OF RIGHT SHOULDER: Primary | ICD-10-CM

## 2021-01-13 DIAGNOSIS — M62.838 TRAPEZIUS MUSCLE SPASM: ICD-10-CM

## 2021-01-13 DIAGNOSIS — M75.101 ROTATOR CUFF SYNDROME OF BOTH SHOULDERS: ICD-10-CM

## 2021-01-13 DIAGNOSIS — M75.102 ROTATOR CUFF SYNDROME OF BOTH SHOULDERS: ICD-10-CM

## 2021-01-13 DIAGNOSIS — M19.011 ARTHRITIS OF RIGHT ACROMIOCLAVICULAR JOINT: ICD-10-CM

## 2021-01-13 PROCEDURE — 97140 MANUAL THERAPY 1/> REGIONS: CPT | Performed by: PHYSICAL THERAPIST

## 2021-01-13 PROCEDURE — 97110 THERAPEUTIC EXERCISES: CPT | Performed by: PHYSICAL THERAPIST

## 2021-01-13 NOTE — PROGRESS NOTES
Daily Note     Today's date: 2021  Patient name: Roc Alas  : 1970  MRN: [id number]  Referring provider: Terry Bentley DO  Dx:   Encounter Diagnosis     ICD-10-CM    1  Subacromial impingement of right shoulder  M75 41    2  Arthritis of right acromioclavicular joint  M19 011    3  Rotator cuff syndrome of both shoulders  M75 101     M75 102    4  Trapezius muscle spasm  M62 838                   Subjective: Patient stated no significant pain prior to treatment session  Objective: See treatment diary below      Assessment: Patient demonstrated significant difficulty with sleeper stretch; moderate difficulty with addition of prone T's; no c/o pain with manual intervention; no significant pain at completion of treatment session  Plan: Continue per plan of care  Progress treatment as tolerated         Precautions: none    Manuals 1-4 (IE)           Posterior and inferior GHJ mobs NV GR KK          MET cross body stretch NV GR KK          IR stretch at 90  NV GR KK                       Neuro Re-Ed             Cervical retraction 10 x 10" NV Reviewed           Scapular retraction 10 x 10" NV Reviewed                                                                            Ther Ex             UBE ant/post standing 3'/3' L1 NV 3' / 3' L3 8 3'/3'                       Corner stretch 4 x 30" NV 4x30" 4x30"          Standing:             - cross body stretch 4 x 30" NV 10x10" 10x10"          - sleeper stretch 4 x 30" NV 10x10" 10x 10"                                    Supine:             - cane AAROM flexion 10 x 5" NV Opp  UE  10x5" Opp  UE  10x5"          - cane AAROM scaption 10 x 5" NV                         Sidelying:             - flexion 2 x 10 NV 2x10 2x10          - abduction 2 x 10 NV 2x10 2x10          - ER (towel under arm) 2 x 10 NV 2x10 2x10                       Prone:             - I's   2x10          - T's   2x10          - Y's                          Ther Activity                                       Gait Training                                       Modalities             Cryo as needed

## 2021-01-18 ENCOUNTER — OFFICE VISIT (OUTPATIENT)
Dept: PHYSICAL THERAPY | Facility: CLINIC | Age: 51
End: 2021-01-18
Payer: COMMERCIAL

## 2021-01-18 DIAGNOSIS — M75.102 ROTATOR CUFF SYNDROME OF BOTH SHOULDERS: ICD-10-CM

## 2021-01-18 DIAGNOSIS — M75.101 ROTATOR CUFF SYNDROME OF BOTH SHOULDERS: ICD-10-CM

## 2021-01-18 DIAGNOSIS — M62.838 TRAPEZIUS MUSCLE SPASM: ICD-10-CM

## 2021-01-18 DIAGNOSIS — M19.011 ARTHRITIS OF RIGHT ACROMIOCLAVICULAR JOINT: ICD-10-CM

## 2021-01-18 DIAGNOSIS — M75.41 SUBACROMIAL IMPINGEMENT OF RIGHT SHOULDER: Primary | ICD-10-CM

## 2021-01-18 PROCEDURE — 97110 THERAPEUTIC EXERCISES: CPT | Performed by: PHYSICAL THERAPIST

## 2021-01-18 NOTE — PROGRESS NOTES
Daily Note     Today's date: 2021  Patient name: Isabel Alcocer  : 1970  MRN: [id number]  Referring provider: Ramya Morales DO  Dx:   Encounter Diagnosis     ICD-10-CM    1  Subacromial impingement of right shoulder  M75 41    2  Arthritis of right acromioclavicular joint  M19 011    3  Rotator cuff syndrome of both shoulders  M75 101     M75 102    4  Trapezius muscle spasm  M62 838                   Subjective: Patient stated no significant pain prior to treatment session  Objective: See treatment diary below      Assessment: Patient demonstrated moderate challenge with addition of prone Y's; no c/o pain with manual intervention  Plan: Continue per plan of care  Progress treatment as tolerated         Precautions: none    Manuals 1-4 (IE)          Posterior and inferior GHJ mobs NV GR KK KK         MET cross body stretch NV GR KK KK         IR stretch at 90  NV GR KK KK                      Neuro Re-Ed             Cervical retraction 10 x 10" NV Reviewed           Scapular retraction 10 x 10" NV Reviewed                                                                            Ther Ex             UBE ant/post standing 3'/3' L1 NV 3' / 3' L3 8 3'/3' 3'/3'                      Corner stretch 4 x 30" NV 4x30" 4x30" 4x30"         Standing:             - cross body stretch 4 x 30" NV 10x10" 10x10" 10x10"         - sleeper stretch 4 x 30" NV 10x10" 10x 10" 10x10"                                   Supine:             - cane AAROM flexion 10 x 5" NV Opp  UE  10x5" Opp  UE  10x5" Opp  UE  10x5"         - cane AAROM scaption 10 x 5" NV                         Sidelying:             - flexion 2 x 10 NV 2x10 2x10 2x10         - abduction 2 x 10 NV 2x10 2x10 2x10         - ER (towel under arm) 2 x 10 NV 2x10 2x10 2x10                      Prone:             - I's   2x10 2x10         - T's   2x10 2x10         - Y's    2x10                      Ther Activity Gait Training                                       Modalities             Cryo as needed

## 2021-01-20 ENCOUNTER — OFFICE VISIT (OUTPATIENT)
Dept: PHYSICAL THERAPY | Facility: CLINIC | Age: 51
End: 2021-01-20
Payer: COMMERCIAL

## 2021-01-20 DIAGNOSIS — M62.838 TRAPEZIUS MUSCLE SPASM: ICD-10-CM

## 2021-01-20 DIAGNOSIS — M75.41 SUBACROMIAL IMPINGEMENT OF RIGHT SHOULDER: Primary | ICD-10-CM

## 2021-01-20 DIAGNOSIS — M75.102 ROTATOR CUFF SYNDROME OF BOTH SHOULDERS: ICD-10-CM

## 2021-01-20 DIAGNOSIS — M19.011 ARTHRITIS OF RIGHT ACROMIOCLAVICULAR JOINT: ICD-10-CM

## 2021-01-20 DIAGNOSIS — M75.101 ROTATOR CUFF SYNDROME OF BOTH SHOULDERS: ICD-10-CM

## 2021-01-20 PROCEDURE — 97140 MANUAL THERAPY 1/> REGIONS: CPT | Performed by: PHYSICAL THERAPIST

## 2021-01-20 PROCEDURE — 97110 THERAPEUTIC EXERCISES: CPT | Performed by: PHYSICAL THERAPIST

## 2021-01-20 NOTE — PROGRESS NOTES
Daily Note     Today's date: 2021  Patient name: Kae Aguirre  : 1970  MRN: [id number]  Referring provider: Joanna Torres DO  Dx:   Encounter Diagnosis     ICD-10-CM    1  Subacromial impingement of right shoulder  M75 41    2  Arthritis of right acromioclavicular joint  M19 011    3  Rotator cuff syndrome of both shoulders  M75 101     M75 102    4  Trapezius muscle spasm  M62 838                   Subjective: Patient states continued pain at night when lying on his right side  Objective: See treatment diary below      Assessment: Patient performed progression of weights with sidelying exercises as listed without c/o pain; positive response to manual intervention  Plan: Continue per plan of care  Progress treatment as tolerated         Precautions: none    Manuals 1-4 (IE)         Posterior and inferior GHJ mobs NV GR KK KK KK        MET cross body stretch NV GR KK KK KK        IR stretch at [facility]                     Neuro Re-Ed             Cervical retraction 10 x 10" NV Reviewed           Scapular retraction 10 x 10" NV Reviewed                                                                            Ther Ex             UBE ant/post standing 3'/3' L1 NV 3' / 3' L3 8 3'/3' 3'/3' 3'/3'                     Corner stretch 4 x 30" NV 4x30" 4x30" 4x30" 4x30"        Standing:             - cross body stretch 4 x 30" NV 10x10" 10x10" 10x10" 10x 10"        - sleeper stretch 4 x 30" NV 10x10" 10x 10" 10x10" 10x 10"                                  Supine:             - cane AAROM flexion 10 x 5" NV Opp  UE  10x5" Opp  UE  10x5" Opp  UE  10x5" Opp  UE  10x5"        - cane AAROM scaption 10 x 5" NV                         Sidelying:             - flexion 2 x 10 NV 2x10 2x10 2x10 2# 2x10        - abduction 2 x 10 NV 2x10 2x10 2x10 2# 2x10        - ER (towel under arm) 2 x 10 NV 2x10 2x10 2x10 4# 2x10                     Prone:             - I's   2x10 2x10 2x10 - T's   2x10 2x10 2x10        - Y's    2x10 2x10                     Ther Activity                                       Gait Training                                       Modalities             Cryo as needed

## 2021-01-25 ENCOUNTER — OFFICE VISIT (OUTPATIENT)
Dept: PHYSICAL THERAPY | Facility: CLINIC | Age: 51
End: 2021-01-25
Payer: COMMERCIAL

## 2021-01-25 DIAGNOSIS — M19.011 ARTHRITIS OF RIGHT ACROMIOCLAVICULAR JOINT: ICD-10-CM

## 2021-01-25 DIAGNOSIS — M75.41 SUBACROMIAL IMPINGEMENT OF RIGHT SHOULDER: Primary | ICD-10-CM

## 2021-01-25 DIAGNOSIS — M62.838 TRAPEZIUS MUSCLE SPASM: ICD-10-CM

## 2021-01-25 DIAGNOSIS — M75.101 ROTATOR CUFF SYNDROME OF BOTH SHOULDERS: ICD-10-CM

## 2021-01-25 DIAGNOSIS — M75.102 ROTATOR CUFF SYNDROME OF BOTH SHOULDERS: ICD-10-CM

## 2021-01-25 PROCEDURE — 97140 MANUAL THERAPY 1/> REGIONS: CPT | Performed by: PHYSICAL THERAPIST

## 2021-01-25 PROCEDURE — 97110 THERAPEUTIC EXERCISES: CPT | Performed by: PHYSICAL THERAPIST

## 2021-01-25 NOTE — PROGRESS NOTES
Daily Note     Today's date: 2021  Patient name: Peggy Dior  : 1970  MRN: [id number]  Referring provider: Kati Chairez DO  Dx:   Encounter Diagnosis     ICD-10-CM    1  Subacromial impingement of right shoulder  M75 41    2  Arthritis of right acromioclavicular joint  M19 011    3  Rotator cuff syndrome of both shoulders  M75 101     M75 102    4  Trapezius muscle spasm  M62 838                   Subjective: Patient without c/o prior to treatment session  Objective: See treatment diary below      Assessment: Patient performed progression of addition of weight with prone exercises without c/o pain; positive response to manual intervention  Plan: Continue per plan of care  Progress treatment as tolerated         Precautions: none    Manuals 1-4 (IE)        Posterior and inferior GHJ mobs NV [address]       MET cross body stretch NV [address]       IR stretch at [address] [place] [address] Drive                    Neuro Re-Ed             Cervical retraction 10 x 10" NV Reviewed           Scapular retraction 10 x 10" NV Reviewed                                                                            Ther Ex             UBE ant/post standing 3'/3' L1 NV 3' / 3' L3 8 3'/3' 3'/3' 3'/3' 3'/3'                     Corner stretch 4 x 30" NV 4x30" 4x30" 4x30" 4x30" 4x30"       Standing:             - cross body stretch 4 x 30" NV 10x10" 10x10" 10x10" 10x 10" 10x 10"       - sleeper stretch 4 x 30" NV 10x10" 10x 10" 10x10" 10x 10" 10x 10"                                 Supine:             - cane AAROM flexion 10 x 5" NV Opp  UE  10x5" Opp  UE  10x5" Opp  UE  10x5" Opp  UE  10x5" Opp  UE  10x5"       - cane AAROM scaption 10 x 5" NV                         Sidelying:             - flexion 2 x 10 NV 2x10 2x10 2x10 2# 2x10 2# 2x10       - abduction 2 x 10 NV 2x10 2x10 2x10 2# 2x10 2# 2x10       - ER (towel under arm) 2 x 10 NV 2x10 2x10 2x10 4# 2x10 4# 2x10 Prone:             - I's   2x10 2x10 2x10 2# 2x10       - T's   2x10 2x10 2x10 2# 2x10       - Y's    2x10 2x10 2# 2x10                    Ther Activity                                       Gait Training                                       Modalities             Cryo as needed

## 2021-01-27 ENCOUNTER — OFFICE VISIT (OUTPATIENT)
Dept: PHYSICAL THERAPY | Facility: CLINIC | Age: 51
End: 2021-01-27
Payer: COMMERCIAL

## 2021-01-27 DIAGNOSIS — M75.102 ROTATOR CUFF SYNDROME OF BOTH SHOULDERS: ICD-10-CM

## 2021-01-27 DIAGNOSIS — M75.101 ROTATOR CUFF SYNDROME OF BOTH SHOULDERS: ICD-10-CM

## 2021-01-27 DIAGNOSIS — M19.011 ARTHRITIS OF RIGHT ACROMIOCLAVICULAR JOINT: ICD-10-CM

## 2021-01-27 DIAGNOSIS — M75.41 SUBACROMIAL IMPINGEMENT OF RIGHT SHOULDER: Primary | ICD-10-CM

## 2021-01-27 PROCEDURE — 97140 MANUAL THERAPY 1/> REGIONS: CPT

## 2021-01-27 PROCEDURE — 97110 THERAPEUTIC EXERCISES: CPT

## 2021-01-27 NOTE — PROGRESS NOTES
Patient informed Jayesh Verma PTA that he wished to make this his last PT visit  Patient discharged from PT at this time

## 2021-01-27 NOTE — PROGRESS NOTES
Daily Note     Today's date: 2021  Patient name: Pam Orlando  : 1970  MRN: [id number]  Referring provider: Chris Wheeler DO  Dx:   Encounter Diagnosis     ICD-10-CM    1  Subacromial impingement of right shoulder  M75 41    2  Arthritis of right acromioclavicular joint  M19 011    3  Rotator cuff syndrome of both shoulders  M75 101     M75 102        Start Time: 4923  Stop Time: 5400  Total time in clinic (min): 60 minutes    Subjective: Pt reported same pain as usual, no changes noted  Objective: See treatment diary below      Assessment:  Continued with treatment session, trialed  cross body stretch with lateral border of scapula pinned against a wall patient reported feeling no difference in the stretch  Tolerated treatment well  Patient demonstrated fatigue post treatment, exhibited good technique with therapeutic exercises and would benefit from continued PT  S/p  Patient reported he would like today to be his last day of PT  Educated patient about direct access  Plan: Continue per plan of care  plan for evaluating therapist to D/C patient after this visit        Precautions: none    Manuals 1-4 (IE)       Posterior and inferior GHJ mobs NV [address] np       MET cross body stretch NV GR KK KK KK KK SC      IR stretch at 90  NV [address] SC                   Neuro Re-Ed             Cervical retraction 10 x 10" NV Reviewed           Scapular retraction 10 x 10" NV Reviewed           Ball on wall in  multidirectional        NV                                                          Ther Ex             UBE ant/post standing 3'/3' L1 NV 3' / 3' L3 8 3'/3' 3'/3' 3'/3' 3'/3'  3'/3                   Corner stretch 4 x 30" NV 4x30" 4x30" 4x30" 4x30" 4x30" 4" 30x       Standing:             - cross body stretch 4 x 30" NV 10x10" 10x10" 10x10" 10x 10" 10x 10" 10" x 10 against the wall      - sleeper stretch 4 x 30" NV 10x10" 10x 10" 10x10" 10x 10" 10x 10" 10 " x 10  In sl                                 Supine:             - cane AAROM flexion 10 x 5" NV Opp  UE  10x5" Opp  UE  10x5" Opp  UE  10x5" Opp  UE  10x5" Opp  UE  10x5" opp UE 10x 5"       - cane AAROM scaption 10 x 5" NV                         Sidelying:             - flexion 2 x 10 NV 2x10 2x10 2x10 2# 2x10 2# 2x10 3# 2x 10       - abduction 2 x 10 NV 2x10 2x10 2x10 2# 2x10 2# 2x10 3# 2x 10       - ER (towel under arm) 2 x 10 NV 2x10 2x10 2x10 4# 2x10 4# 2x10 4# 2x 10                    Prone:             - I's   2x10 2x10 2x10 2# 2x10 3# 2x 10       - T's   2x10 2x10 2x10 2# 2x10 3# 2x 10       - Y's    2x10 2x10 2# 2x10 3# 2x 10                    Ther Activity                                       Gait Training                                       Modalities             Cryo as needed

## 2021-02-22 ENCOUNTER — OFFICE VISIT (OUTPATIENT)
Dept: URGENT CARE | Facility: CLINIC | Age: 51
End: 2021-02-22
Payer: COMMERCIAL

## 2021-02-22 VITALS
RESPIRATION RATE: 18 BRPM | DIASTOLIC BLOOD PRESSURE: 81 MMHG | HEART RATE: 62 BPM | BODY MASS INDEX: 26.12 KG/M2 | TEMPERATURE: 96.7 F | HEIGHT: 64 IN | SYSTOLIC BLOOD PRESSURE: 141 MMHG | OXYGEN SATURATION: 99 % | WEIGHT: 153 LBS

## 2021-02-22 DIAGNOSIS — M79.675 GREAT TOE PAIN, LEFT: Primary | ICD-10-CM

## 2021-02-22 PROCEDURE — 99213 OFFICE O/P EST LOW 20 MIN: CPT

## 2021-02-22 RX ORDER — CEPHALEXIN 500 MG/1
500 CAPSULE ORAL EVERY 6 HOURS SCHEDULED
Qty: 20 CAPSULE | Refills: 0 | Status: SHIPPED | OUTPATIENT
Start: 2021-02-22 | End: 2021-02-27

## 2021-02-22 NOTE — PATIENT INSTRUCTIONS
Cellulitis   AMBULATORY CARE:   Cellulitis  is a skin infection caused by bacteria  Cellulitis usually appears on the legs and feet, arms and hands, or face  Common signs and symptoms include the following:   · A red, warm, swollen area on your skin    · Pain when the area is touched    · Bumps or blisters (abscess) that may drain pus    · Bumpy, raised skin that feels like an orange peel    Call 911 if:   · You have sudden trouble breathing or chest pain  Seek care immediately if:   · Your wound gets larger and more painful  · You feel a crackling under your skin when you touch it  · You have purple dots or bumps on your skin, or you see bleeding under your skin  · You have new swelling and pain in your legs  · The red, warm, swollen area gets larger  · You see red streaks coming from the infected area  Contact your healthcare provider if:   · You have a fever  · Your fever or pain does not go away or gets worse  · The area does not get smaller after 2 days of antibiotics  · Your skin is flaking or peeling off  · You have questions or concerns about your condition or care  Treatment for cellulitis  may decrease symptoms, stop the infection from spreading, and cure the infection  Treatment depends on how severe your cellulitis is  Cellulitis may go away on its own  You may  instead need antibiotics to help treat the bacterial infection and medicines for pain  Your healthcare provider may draw a [place] around the edges of your cellulitis  If your cellulitis spreads, your healthcare provider will see it outside of the [place]  Manage your symptoms:   · Elevate the area above the level of your heart  as often as you can  This will help decrease swelling and pain  Prop the area on pillows or blankets to keep it elevated comfortably  · Clean the area daily until the wound scabs over  Gently wash the area with soap and water  Pat dry  Use dressings as directed       · Place cool or warm, wet cloths on the area as directed  Use clean cloths and clean water  Leave it on the area until the cloth is room temperature  Pat the area dry with a clean, dry cloth  The cloths may help decrease pain  Prevent cellulitis:   · Do not scratch bug bites or areas of injury  You increase your risk for cellulitis by scratching these areas  · Do not share personal items, such as towels, clothing, and razors  · Clean exercise equipment  with germ-killing  before and after you use it  · Wash your hands often  Use soap and water  Wash your hands after you use the bathroom, change a child's diapers, or sneeze  Wash your hands before you prepare or eat food  Use lotion to prevent dry, cracked skin  · Wear pressure stockings as directed  You may be told to wear the stockings if you have peripheral edema  The stockings improve blood flow and decrease swelling  · Treat athlete's foot  This can help prevent the spread of a bacterial skin infection  Follow up with your healthcare provider within 3 days, or as directed: Your healthcare provider will check if your cellulitis is getting better  You may need different medicine  Write down your questions so you remember to ask them during your visits  © Copyright [address] Information is for End User's use only and may not be sold, redistributed or otherwise used for commercial purposes  All illustrations and images included in CareNotes® are the copyrighted property of A D A M [employer] Inc  or Anali Engel  The above information is an  only  It is not intended as medical advice for individual conditions or treatments  Talk to your doctor, nurse or pharmacist before following any medical regimen to see if it is safe and effective for you

## 2021-03-01 NOTE — PROGRESS NOTES
[facility]        NAME: Garry Edi is a 52 y o  male  : 1970    MRN: [id number]  DATE: 2021  TIME: 9:23 AM    Assessment and Plan   Great toe pain, left [M79 675]  1  Great toe pain, left  cephalexin (KEFLEX) 500 mg capsule         Patient Instructions       Follow up with PCP in 3-5 days  Proceed to  ER if symptoms worsen  Chief Complaint     Chief Complaint   Patient presents with    Toe Pain     left foot, started a week and a half ago         History of Present Illness       Toe Pain   The incident occurred more than 1 week ago  The incident occurred at home  There was no injury mechanism  The pain is present in the left toes  The pain is at a severity of 5/10  The pain is moderate  The pain has been constant since onset  He reports no foreign bodies present  The symptoms are aggravated by weight bearing  He has tried rest for the symptoms  The treatment provided no relief  Review of Systems   Review of Systems   Constitutional: Negative  Respiratory: Negative  Cardiovascular: Negative  Musculoskeletal: Positive for gait problem and joint swelling  Skin: Positive for color change  All other systems reviewed and are negative          Current Medications       Current Outpatient Medications:     Diclofenac Sodium (VOLTAREN) 1 %, Apply 2 g topically 4 (four) times a day, Disp: 1 Tube, Rfl: 0    fluticasone (FLONASE) 50 mcg/act nasal spray, 1 spray into each nostril daily, Disp: 1 Bottle, Rfl: 0    traZODone (DESYREL) 50 mg tablet, TAKE 1 TABLET DAILY AT BEDTIME, Disp: 90 tablet, Rfl: 3    Current Allergies     Allergies as of 2021    (No Known Allergies)            The following portions of the patient's history were reviewed and updated as appropriate: allergies, current medications, past family history, past medical history, past social history, past surgical history and problem list      Past Medical History:   Diagnosis Date    Allergic     Anxiety     Seasonal allergies     Shoulder injury, right, sequela        History reviewed  No pertinent surgical history  Family History   Problem Relation Age of Onset   Mel Lucio Breast cancer Mother     Hypertension Mother     No Known Problems Father          Medications have been verified  Objective   /81   Pulse 62   Temp (!) 96 7 °F (35 9 °C) (Temporal)   Resp 18   Ht 5' 3 5" (1 613 m)   Wt 69 4 kg (153 lb)   SpO2 99%   BMI 26 68 kg/m²        Physical Exam     Physical Exam  Vitals and nursing note reviewed  Constitutional:       Appearance: He is well-developed  Cardiovascular:      Rate and Rhythm: Normal rate and regular rhythm  Pulmonary:      Effort: Pulmonary effort is normal       Breath sounds: Normal breath sounds  Abdominal:      General: Bowel sounds are normal       Palpations: Abdomen is soft  Musculoskeletal:      Left foot: Swelling and tenderness present  Skin:     General: Skin is warm  Capillary Refill: Capillary refill takes less than 2 seconds  Findings: Erythema present  Yes

## 2021-03-11 ENCOUNTER — OFFICE VISIT (OUTPATIENT)
Dept: FAMILY MEDICINE CLINIC | Facility: CLINIC | Age: 51
End: 2021-03-11
Payer: COMMERCIAL

## 2021-03-11 VITALS
HEART RATE: 62 BPM | WEIGHT: 159.6 LBS | BODY MASS INDEX: 27.25 KG/M2 | TEMPERATURE: 98.8 F | OXYGEN SATURATION: 98 % | DIASTOLIC BLOOD PRESSURE: 72 MMHG | HEIGHT: 64 IN | SYSTOLIC BLOOD PRESSURE: 120 MMHG

## 2021-03-11 DIAGNOSIS — L60.0 INGROWN TOENAIL OF BOTH FEET: Primary | ICD-10-CM

## 2021-03-11 PROCEDURE — 99213 OFFICE O/P EST LOW 20 MIN: CPT | Performed by: PHYSICIAN ASSISTANT

## 2021-03-11 RX ORDER — TRIAMCINOLONE ACETONIDE 5 MG/G
OINTMENT TOPICAL 2 TIMES DAILY
Qty: 30 G | Refills: 0 | Status: SHIPPED | OUTPATIENT
Start: 2021-03-11 | End: 2022-04-14 | Stop reason: ALTCHOICE

## 2021-03-11 NOTE — PROGRESS NOTES
Samantha Spurling 1970 male MRN: [id number]    Acute Visit        ASSESSMENT/PLAN  Problem List Items Addressed This Visit     None      Visit Diagnoses     Ingrown toenail of both feet    -  Primary    Relevant Medications    triamcinolone (KENALOG) 0 5 % ointment        Hx and exam consistent with ingrown toenails 2/2 repeated trauma  Will trial warm soaks 10-20 minutes BID, gently peel away the nail edge and apply topical steroid  Do this for 1 week, if no improvement will refer to podiatry  Change to more comfortable fitting shoe  No evidence of paronychia or cellulitis  Future Appointments   Date Time Provider Kirk Montalvo   3/17/2021  3:15 PM Jennifer Horn DO [facility]        SUBJECTIVE  CC: Toe Pain (Left / rt - numbness , cold feeling , painful , nails are purple x 1 month - Big Toe)       Pt presents with complaints of bilateral great toe pain at the medial nail folds and also the tips of the toes  He does a lot of hiking and walking  He was seen in UC recently who diagnosed with cellulitis and was given keflex without much improvement in his pain  There is swelling along the nail fold  He shares the area occasionally feels numb  No fevers  Samantha Spurling is a 52 y o  male who presented for an acute visit complaining of  Review of Systems   Constitutional: Negative  Musculoskeletal: Positive for arthralgias (toe pain)  Skin: Positive for color change  Historical Information   The patient history was reviewed as follows:  Past Medical History:   Diagnosis Date    Allergic     Anxiety     Seasonal allergies     Shoulder injury, right, sequela      History reviewed  No pertinent surgical history    Family History   Problem Relation Age of Onset   Judithe Spruce Breast cancer Mother     Hypertension Mother     No Known Problems Father       Social History   Social History     Substance and Sexual Activity   Alcohol Use Yes    Alcohol/week: 2 0 standard drinks    Types: 2 Standard drinks or equivalent per week    Frequency: 2-3 times a week    Comment: socially     Social History     Substance and Sexual Activity   Drug Use Never     Social History     Tobacco Use   Smoking Status Never Smoker   Smokeless Tobacco Never Used   Tobacco Comment    Former Smoke as per [employer]  Medications:   Meds/Allergies   Current Outpatient Medications   Medication Sig Dispense Refill    Diclofenac Sodium (VOLTAREN) 1 % Apply 2 g topically 4 (four) times a day 1 Tube 0    fluticasone (FLONASE) 50 mcg/act nasal spray 1 spray into each nostril daily 1 Bottle 0    traZODone (DESYREL) 50 mg tablet TAKE 1 TABLET DAILY AT BEDTIME 90 tablet 3    triamcinolone (KENALOG) 0 5 % ointment Apply topically 2 (two) times a day 30 g 0     No current facility-administered medications for this visit  No Known Allergies    OBJECTIVE  Vitals:   Vitals:    03/11/21 1542   BP: 120/72   BP Location: Left arm   Patient Position: Sitting   Cuff Size: Standard   Pulse: 62   Temp: 98 8 °F (37 1 °C)   SpO2: 98%   Weight: 72 4 kg (159 lb 9 6 oz)   Height: 5' 3 5" (1 613 m)       Invasive Devices     None                 Physical Exam  Vitals signs and nursing note reviewed  Constitutional:       General: He is not in acute distress  Appearance: Normal appearance  He is not ill-appearing  HENT:      Head: Normocephalic and atraumatic  Nose: Nose normal    Eyes:      Conjunctiva/sclera: Conjunctivae normal    Neck:      Musculoskeletal: Normal range of motion  Cardiovascular:      Pulses: Normal pulses  Pulmonary:      Effort: Pulmonary effort is normal  No respiratory distress  Musculoskeletal: Normal range of motion  General: Tenderness present  Skin:     Capillary Refill: Capillary refill takes less than 2 seconds  Findings: Bruising present  No erythema  Comments: Bilateral L > R medial nail folds are inflamed and tender   Tenderness at the tip of the toe as well  No widespread erythema or discharge or signs of cellulitis/paronychia   Neurological:      Mental Status: He is alert  Sensory: No sensory deficit  Lab:  I have personally reviewed all pertinent results

## 2021-03-17 ENCOUNTER — OFFICE VISIT (OUTPATIENT)
Dept: OBGYN CLINIC | Facility: CLINIC | Age: 51
End: 2021-03-17
Payer: COMMERCIAL

## 2021-03-17 VITALS
BODY MASS INDEX: 27.14 KG/M2 | SYSTOLIC BLOOD PRESSURE: 110 MMHG | HEIGHT: 64 IN | HEART RATE: 65 BPM | WEIGHT: 159 LBS | DIASTOLIC BLOOD PRESSURE: 72 MMHG

## 2021-03-17 DIAGNOSIS — S46.001D ROTATOR CUFF INJURY, RIGHT, SUBSEQUENT ENCOUNTER: Primary | ICD-10-CM

## 2021-03-17 PROCEDURE — 99213 OFFICE O/P EST LOW 20 MIN: CPT | Performed by: FAMILY MEDICINE

## 2021-03-17 RX ORDER — IBUPROFEN 800 MG/1
800 TABLET ORAL 3 TIMES DAILY PRN
Qty: 90 TABLET | Refills: 0 | Status: SHIPPED | OUTPATIENT
Start: 2021-03-17 | End: 2022-01-13

## 2021-03-17 NOTE — PROGRESS NOTES
Assessment/Plan:  Assessment/Plan   Diagnoses and all orders for this visit:    Rotator cuff injury, right, subsequent encounter  -     ibuprofen (MOTRIN) 800 mg tablet; Take 1 tablet (800 mg total) by mouth 3 (three) times a day as needed for moderate pain  -     MRI shoulder right wo contrast; Future        52year-old active right-hand-dominant male with right shoulder pain of more than 5 months duration  Discussed with patient physical exam, impression and plan  Right shoulder has range of motion limited to forward flexion of 130°, abduction 90°, and internal rotation lumbar spine  He has 4+/5 strength external rotation and supraspinatus  There is positive Emmanuel maneuver and equivocal empty can test   He has more than 5 months since onset of symptoms and still symptomatic despite conservative management of corticosteroid injection on 12/23/2020, formal physical therapy and home exercises since 01/04/2021, topical Voltaren gel daily since 12/23/2020, and taking Aleve twice daily for more than 6 weeks  At this time I will refer him for MRI of the right shoulder to evaluate for rotator cuff tear and impingement as surgical intervention may be warranted  He will follow up after getting MRI done  Subjective:   Patient ID: Hansel Li is a 52 y o  male  Chief Complaint   Patient presents with    Right Shoulder - Follow-up, Pain       60-year-old active right-hand-dominant male following up for right shoulder pain of more than 5 months duration  He was last seen by me nearly 3 months ago at which point he was given corticosteroid injection to the right shoulder and he was referred to formal physical therapy  He was advised on taking supplements and using topical Voltaren gel and topical CBD  He has been attending formal physical therapy and doing home exercises since 01/04/2021  He reports that injection provided improvement pain that lasted approximately 6-7 weeks    Since that time pain has been gradually worsening  He has pain described generalized to the shoulder worse at the anterior lateral aspect, radiating distally along the lateral aspect the upper arm to the elbow, aching and sometimes sharp, worse with abducting the arm and reaching behind his back, associated limited range of motion, and improved resting  He has also been taking Aleve twice a day to help with symptoms, applying Voltaren gel twice a day, and also taking supplements and doing icing  Shoulder Pain  This is a new problem  The current episode started more than 1 month ago  The problem occurs daily  The problem has been gradually worsening  Associated symptoms include arthralgias  Pertinent negatives include no joint swelling, numbness or weakness  Exacerbated by: Arm movement, physical activity  He has tried rest, NSAIDs and ice (Physical therapy, home exercise, corticosteroid injection) for the symptoms  The treatment provided mild relief  Review of Systems   Musculoskeletal: Positive for arthralgias  Negative for joint swelling  Neurological: Negative for weakness and numbness  Objective:  Vitals:    03/17/21 1522   BP: 110/72   Pulse: 65   Weight: 72 1 kg (159 lb)   Height: 5' 3 5" (1 613 m)     Right Shoulder Exam     Range of Motion   Active abduction: 90   Forward flexion: 130   Internal rotation 0 degrees: Lumbar     Muscle Strength   Right shoulder normal muscle strength: 4+/5 strength external rotation and supraspinatus  Abduction: 5/5   Internal rotation: 5/5     Tests   Emmanuel test: positive    Comments:  Negative belly press   Equivocal empty can test      Left Shoulder Exam     Muscle Strength   Abduction: 5/5   Internal rotation: 5/5   External rotation: 5/5   Supraspinatus: 5/5             Physical Exam  Vitals signs and nursing note reviewed  Constitutional:       General: He is not in acute distress  Appearance: He is well-developed     HENT:      Head: Normocephalic and atraumatic  Right Ear: External ear normal       Left Ear: External ear normal    Eyes:      Conjunctiva/sclera: Conjunctivae normal    Neck:      Trachea: No tracheal deviation  Cardiovascular:      Rate and Rhythm: Normal rate  Pulmonary:      Effort: Pulmonary effort is normal  No respiratory distress  Abdominal:      General: There is no distension  Skin:     General: Skin is warm and dry  Neurological:      Mental Status: He is alert and oriented to person, place, and time     Psychiatric:         Behavior: Behavior normal

## 2021-03-18 ENCOUNTER — TELEPHONE (OUTPATIENT)
Dept: OBGYN CLINIC | Facility: CLINIC | Age: 51
End: 2021-03-18

## 2021-03-20 ENCOUNTER — HOSPITAL ENCOUNTER (OUTPATIENT)
Dept: MRI IMAGING | Facility: HOSPITAL | Age: 51
Discharge: HOME | End: 2021-03-20
Attending: FAMILY MEDICINE
Payer: COMMERCIAL

## 2021-03-20 DIAGNOSIS — S46.001D ROTATOR CUFF INJURY, RIGHT, SUBSEQUENT ENCOUNTER: ICD-10-CM

## 2021-03-20 PROCEDURE — G1004 CDSM NDSC: HCPCS

## 2021-03-20 PROCEDURE — 73221 MRI JOINT UPR EXTREM W/O DYE: CPT

## 2021-03-22 ENCOUNTER — OFFICE VISIT (OUTPATIENT)
Dept: OBGYN CLINIC | Facility: CLINIC | Age: 51
End: 2021-03-22
Payer: COMMERCIAL

## 2021-03-22 VITALS
BODY MASS INDEX: 27.31 KG/M2 | WEIGHT: 160 LBS | SYSTOLIC BLOOD PRESSURE: 122 MMHG | DIASTOLIC BLOOD PRESSURE: 80 MMHG | HEART RATE: 57 BPM | HEIGHT: 64 IN

## 2021-03-22 DIAGNOSIS — M75.41 SUBACROMIAL IMPINGEMENT OF RIGHT SHOULDER: Primary | ICD-10-CM

## 2021-03-22 DIAGNOSIS — M19.011 ARTHRITIS OF RIGHT ACROMIOCLAVICULAR JOINT: ICD-10-CM

## 2021-03-22 PROCEDURE — 1036F TOBACCO NON-USER: CPT | Performed by: FAMILY MEDICINE

## 2021-03-22 PROCEDURE — 99213 OFFICE O/P EST LOW 20 MIN: CPT | Performed by: FAMILY MEDICINE

## 2021-03-22 NOTE — PROGRESS NOTES
Assessment/Plan:  Assessment/Plan   Diagnoses and all orders for this visit:    Subacromial impingement of right shoulder  -     Ambulatory referral to Orthopedic Surgery; Future    Arthritis of right acromioclavicular joint        52year-old active right-hand male with right shoulder pain of more than 5 months duration  Discussed with patient MRI results, impression and plan  MRI of the right shoulder is noted for tendinosis of the supraspinatus and infraspinatus, chronic degenerative changes and labrum, mild cystic changes at the anterior inferior aspect of glenoid with globular appearance of the anterior inferior labrum, AC joint hypertrophy with capsular thickening with mild indentation on the supraspinatus  I discussed with patient that he is likely not symptomatic from glenohumeral joint pathology, and likely symptomatic from shoulder impingement  Improvement following corticosteroid injection was 6-7 weeks, and he attended physical therapy  At this time I will refer him to orthopedic surgeon for further evaluation and recommendation of treatment  Subjective:   Patient ID: Madeleine Macdonald is a 52 y o  male  Chief Complaint   Patient presents with    Right Shoulder - Follow-up, Pain       62-year-old right-hand-dominant male following up for right shoulder pain of more than 5 months duration  He was last seen by me 5 days ago at which point he was referred for MRI of the right shoulder  He had received corticosteroid injection 12/23/2020, and reported injection provided improvement in pain that lasted about 6-7 weeks  He has been experiencing pain described as generalized to the shoulder but worse at the anterior lateral aspect, radiating distally along the lateral aspect of the upper arm to the elbow, achy and sometimes sharp, worse with abducting the arm and reaching behind his back, associated limited range of motion, and improved resting  Shoulder Pain  This is a new problem   The current episode started more than 1 month ago  The problem occurs daily  The problem has been gradually worsening  Associated symptoms include arthralgias  Pertinent negatives include no joint swelling, numbness or weakness  Exacerbated by: Arm elevation  He has tried rest, NSAIDs and position changes (Corticosteroid injection, physical therapy) for the symptoms  The treatment provided mild relief  Review of Systems   Musculoskeletal: Positive for arthralgias  Negative for joint swelling  Neurological: Negative for weakness and numbness  Objective:  Vitals:    03/22/21 1535   BP: 122/80   Pulse: 57   Weight: 72 6 kg (160 lb)   Height: 5' 3 5" (1 613 m)     Ortho Exam    Physical Exam  Vitals signs and nursing note reviewed  Constitutional:       General: He is not in acute distress  Appearance: He is well-developed  HENT:      Head: Normocephalic and atraumatic  Right Ear: External ear normal       Left Ear: External ear normal    Eyes:      Conjunctiva/sclera: Conjunctivae normal    Neck:      Trachea: No tracheal deviation  Cardiovascular:      Rate and Rhythm: Normal rate  Pulmonary:      Effort: Pulmonary effort is normal  No respiratory distress  Abdominal:      General: There is no distension  Skin:     General: Skin is warm and dry  Neurological:      Mental Status: He is alert and oriented to person, place, and time  Psychiatric:         Behavior: Behavior normal          I have personally reviewed pertinent films in PACS and my interpretation is No appreciable rotator cuff tear  Pneumothorax

## 2021-03-23 ENCOUNTER — OFFICE VISIT (OUTPATIENT)
Dept: OBGYN CLINIC | Facility: CLINIC | Age: 51
End: 2021-03-23
Payer: COMMERCIAL

## 2021-03-23 VITALS
HEART RATE: 52 BPM | DIASTOLIC BLOOD PRESSURE: 74 MMHG | HEIGHT: 64 IN | WEIGHT: 159 LBS | SYSTOLIC BLOOD PRESSURE: 116 MMHG | RESPIRATION RATE: 20 BRPM | BODY MASS INDEX: 27.14 KG/M2

## 2021-03-23 DIAGNOSIS — M75.01 ADHESIVE CAPSULITIS OF RIGHT SHOULDER: Primary | ICD-10-CM

## 2021-03-23 DIAGNOSIS — M75.41 SUBACROMIAL IMPINGEMENT OF RIGHT SHOULDER: ICD-10-CM

## 2021-03-23 PROCEDURE — 3008F BODY MASS INDEX DOCD: CPT | Performed by: FAMILY MEDICINE

## 2021-03-23 PROCEDURE — 99213 OFFICE O/P EST LOW 20 MIN: CPT | Performed by: ORTHOPAEDIC SURGERY

## 2021-03-23 PROCEDURE — 20610 DRAIN/INJ JOINT/BURSA W/O US: CPT | Performed by: ORTHOPAEDIC SURGERY

## 2021-03-23 RX ORDER — LANOLIN ALCOHOL/MO/W.PET/CERES
1 CREAM (GRAM) TOPICAL 3 TIMES DAILY
COMMUNITY

## 2021-03-23 RX ORDER — TURMERIC ROOT EXTRACT 500 MG
TABLET ORAL
COMMUNITY

## 2021-03-23 RX ADMIN — LIDOCAINE HYDROCHLORIDE 2 ML: 10 INJECTION, SOLUTION INFILTRATION; PERINEURAL at 16:43

## 2021-03-23 RX ADMIN — METHYLPREDNISOLONE ACETATE 2 ML: 40 INJECTION, SUSPENSION INTRA-ARTICULAR; INTRALESIONAL; INTRAMUSCULAR; SOFT TISSUE at 16:43

## 2021-03-23 RX ADMIN — BUPIVACAINE HYDROCHLORIDE 2 ML: 2.5 INJECTION, SOLUTION INFILTRATION; PERINEURAL at 16:43

## 2021-03-23 NOTE — PROGRESS NOTES
Patient Name:  Wu Black  MRN:  [id number]    [address] [place], [place] [address]     1  Subacromial impingement of right shoulder  -     Ambulatory referral to Orthopedic Surgery        81-year-old male right shoulder adhesive capsulitis  MRI and xrays reviewed in office with patient  In light of physical exam findings of stiffness with abduction, flexion, and external rotation, recommended and patient accepted glenohumeral cortisone injection  Risks or injections discussed with patient including, but not limited to continued pain, stiffness, and infection  Patient tolerated procedure well and was able to range Right shoulder without difficulty  Advised patient to begin therapy with focus on ROM for adhesive capsulitis, additional strengthening, stretching, scapulothoracic and postural exercises with modalities as needed and continue HEP  Continue ibuprofen as prescribed, but caution to continuously taking 3 times daily  Patient verbalized understanding of the above and will follow up in office in approximately 6 weeks after PT has been attended for reevaluation of ROM and shoulder pain  Chief Complaint      right shoulder pain    History of the Present Illness     Wu Black is a 52 y o  male with  Right shoulder pain  Patient reports he noted right shoulder pain beginning in October 2020 without specific injury, but does recall pulling cord on a chain saw  Reports he sought out care with PCP who scheduled appointment with Dr Brandyn Lamar  Patient received CSI on 12/23/2021 which relieved shoulder pain for approximately 6 weeks  Patient also participated in PT in which did not provide much relief; he also continued home exercises as prescribed  Today, patient reports continued pain, worse on specific days with increased overhead reaching and lifting  Patient admits to night pain that does wake him up if he rolls onto Right side  He was recently prescribed Ibuprofen 800mg which does decrease shoulder pain   Patient also admits to a history of Right elbow pain which was initially relieved from CSI in Right shoulder  He tried using lateral epicondylitis brace which did not provide pain relief  He also admits to a history of cervical spine pain with radicular symptoms bilateral upper extremities  He has seen chiropractor in the past who performed traction and additional  Exercises; he has no lingering numbness or tingling or neck pain  Patient reports he is a  and does not perform any lifting activities  Review of Systems     Review of Systems   Constitutional: Negative for chills and fever  HENT: Negative for ear pain and sore throat  Eyes: Negative for pain and visual disturbance  Respiratory: Negative for cough and shortness of breath  Cardiovascular: Negative for chest pain and palpitations  Gastrointestinal: Negative for abdominal pain and vomiting  Genitourinary: Negative for dysuria and hematuria  Musculoskeletal: Negative for arthralgias, back pain and gait problem  Skin: Negative for color change and rash  Neurological: Negative for seizures and syncope  All other systems reviewed and are negative  Physical Exam     /74   Pulse (!) 52   Resp 20   Ht 5' 3 5" (1 613 m)   Wt 72 1 kg (159 lb)   BMI 27 72 kg/m²     Right shoulder: Active range of motion to 160 degrees forward flexion with pain,  150 degrees abduction with pain,  75-80 degrees external rotation at neutral and 80 ER at 90 degrees shoulder abduction with tightness and pain noted, and internal rotation to L5  Passive range of motion with stabilization of scapula to about  130-140 degrees with firm endpoint,  Compared to contralateral side of 150-155 forward flexion without pain  Approximately 10-15 degrees Internal rotation at 90° of abduction with pain and stiffness    There is moderate tenderness present over the pectoralis minor, proximal biceps tendon; no tenderness over greater tuberosity of humerus  Empty can testing is negative with associated pain  There is 5/5 strength with external rotation testing at the side  Belly press test is negative  Emmanuel test is positive  Iberville's test is positive  Speed's test ispositive  There is a negative cross-arm adduction test  Tenderness to palpation lateral epicondyle  There is no tenderness about the rhomboid and trapezial musculature  The patient is neurovascularly intact distally in the extremity  Cervical spine: Active range of motion flexion 85, extension to 75-80 degrees, rotation left and right to  90 degrees, and lateral flexion left and right to  50 degrees  There is  no midline tenderness  There is  Mild right-sided paraspinal hypertonicity and tenderness  Sensation intact to light touch bilateral C5 through T1 dermatomes  5/5 motor strength bilateral biceps, triceps, wrist extension, finger flexion, finger abduction  Spurling test is  Negative bilaterally  Negative Almanzar's test         Eyes:  Anicteric sclerae  Neck:  Supple  Lungs:  Normal respiratory effort  Cardiovascular:  Capillary refill is less than 2 seconds  Skin:  Intact without erythema  Neurologic:  Sensation grossly intact to light touch  Psychiatric:  Mood and affect are appropriate  Data Review     I have personally reviewed pertinent films in PACS, and my interpretation follows:    Xrays of Right shoulder demonstrate no acute fracture, dislocation, or significant degenerative changes  MRI Right shoulder demonstrates mild tendinosis of supra and infraspinatus, fluid located around proximal biceps tendon sheath; no rotator cuff tear noted, no significant degenerative change noted  Past Medical History:   Diagnosis Date    Allergic     Anxiety     Seasonal allergies     Shoulder injury, right, sequela        History reviewed  No pertinent surgical history      No Known Allergies    Current Outpatient Medications on File Prior to Visit   Medication Sig Dispense Refill    Diclofenac Sodium (VOLTAREN) 1 % Apply 2 g topically 4 (four) times a day 1 Tube 0    fluticasone (FLONASE) 50 mcg/act nasal spray 1 spray into each nostril daily 1 Bottle 0    glucosamine-chondroitin 500-400 MG tablet Take 1 tablet by mouth 3 (three) times a day      ibuprofen (MOTRIN) 800 mg tablet Take 1 tablet (800 mg total) by mouth 3 (three) times a day as needed for moderate pain 90 tablet 0    Misc Natural Products (TART CHERRY ADVANCED PO) Take by mouth      NON FORMULARY CBD [place] to [place] prn      traZODone (DESYREL) 50 mg tablet TAKE 1 TABLET DAILY AT BEDTIME 90 tablet 3    triamcinolone (KENALOG) 0 5 % ointment Apply topically 2 (two) times a day 30 g 0    Turmeric 500 MG TABS Take by mouth       No current facility-administered medications on file prior to visit  Social History     Tobacco Use    Smoking status: Never Smoker    Smokeless tobacco: Never Used    Tobacco comment: Former Smoke as per [employer]  Substance Use Topics    Alcohol use:  Yes     Alcohol/week: 2 0 standard drinks     Types: 2 Standard drinks or equivalent per week     Frequency: 2-3 times a week     Comment: socially    Drug use: Never       Family History   Problem Relation Age of Onset    Breast cancer Mother     Hypertension Mother     No Known Problems Father              Procedures Performed     Large joint arthrocentesis: R glenohumeral  Universal Protocol:  Consent given by: patient  Patient identity confirmed: verbally with patient    Supporting Documentation  Indications: pain   Procedure Details  Location: shoulder - R glenohumeral  Needle size: 22 G  Approach: superior  Medications administered: 2 mL bupivacaine 0 25 %; 2 mL lidocaine 1 %; 2 mL methylPREDNISolone acetate 40 mg/mL    Patient tolerance: patient tolerated the procedure well with no immediate complications  Dressing:  Sterile dressing applied              Catalina Escobar PA-C

## 2021-03-26 DIAGNOSIS — Z23 ENCOUNTER FOR IMMUNIZATION: ICD-10-CM

## 2021-04-05 ENCOUNTER — EVALUATION (OUTPATIENT)
Dept: PHYSICAL THERAPY | Facility: CLINIC | Age: 51
End: 2021-04-05
Payer: COMMERCIAL

## 2021-04-05 DIAGNOSIS — M75.01 ADHESIVE CAPSULITIS OF RIGHT SHOULDER: Primary | ICD-10-CM

## 2021-04-05 PROCEDURE — 97161 PT EVAL LOW COMPLEX 20 MIN: CPT | Performed by: PHYSICAL THERAPIST

## 2021-04-05 PROCEDURE — 97110 THERAPEUTIC EXERCISES: CPT | Performed by: PHYSICAL THERAPIST

## 2021-04-05 NOTE — PROGRESS NOTES
PT Evaluation     Today's date: 2021  Patient name: Wu Black  : 1970  MRN: [id number]  Referring provider: Aminah Ibrahim PA-C  Dx:   Encounter Diagnosis     ICD-10-CM    1  Adhesive capsulitis of right shoulder  M75 01 Ambulatory referral to Physical Therapy       Start Time: 1525  Stop Time: 1630  Total time in clinic (min): 65 minutes    Assessment  Assessment details: Wu Black is a 52 y o  male who presents with pain, decreased strength, decreased ROM, decreased joint mobility and postural  dysfunction  Due to these impairments, Patient has difficulty performing a/iadls, recreational activities and engaging in social activities  Patient's clinical presentation is consistent with their referring diagnosis of right shoulder adhesive capsulitis  Patient would benefit from skilled physical therapy to address their aforementioned impairments, improve their level of function and to improve their overall quality of life  Impairments: abnormal muscle firing, abnormal or restricted ROM, activity intolerance, impaired physical strength, lacks appropriate home exercise program, pain with function and poor posture   Understanding of Dx/Px/POC: excellent   Prognosis: good    Goals  Short Term Goals: to be achieved by 4 weeks  1) Patient to be independent with basic HEP  2) Decrease pain to 4/10 at it's worst   3) Increase UE strength by 1/2 MMT grade in all deficient planes  4) Increase UE ROM by > 5 deg in all deficient planes  5) Patient to report decreased sleep interruption secondary to pain  Long Term Goals: to be achieved by discharge  1) FOTO equal to or greater than TBD  2) Patient to be independent with comprehensive HEP  3) Abolish pain for improved quality of life  4) Increase UE strength to 5/5 MMT grade in all deficient planes to improve a/iadls  5) Increase UE ROM to within 5 deg of contralateral UE to improve a/iadls    6) Patient to report no sleep interruption secondary to pain  Plan  Patient would benefit from: skilled PT  Planned modality interventions: biofeedback, cryotherapy, hydrotherapy, unattended electrical stimulation, thermotherapy: hydrocollator packs and low level laser therapy  Planned therapy interventions: activity modification, ADL retraining, behavior modification, body mechanics training, functional ROM exercises, home exercise program, IADL retraining, joint mobilization, manual therapy, massage, neuromuscular re-education, patient education, postural training, strengthening, stretching, therapeutic activities and therapeutic exercise  Frequency: 2-3x week  Duration in weeks: 12  Plan of Care beginning date: 4/5/2021  Plan of Care expiration date: 6/28/2021  Treatment plan discussed with: patient        Subjective Evaluation    History of Present Illness  Mechanism of injury: Patient reports that beginning in November, 2020 he began to experience insidious onset of right shoulder pain  Patient went to his PCP who referred to ortho  Patient received a steroid injection with temporary relief  Patient referred to PT with minimal improvement  Patient referred to surgeon where he received a 2nd injection without relief  Patient has been prescribed supplements and antiinflammatories, which help to partially alleviate his pain  Despite interventions to date Patient's pain remains constant and is aggravated when reaching behind his back, across his body and overhead  Patient's quality of sleep remains interrupted and painful when laying on right side  Patient is hesitant to go mountain bike riding d/t fear of further injuring his shoulder  Patient has intermittent clicking in his shoulder  Patient denies experiencing tingling/numbness, instability or radicular pain  Patient estimates his right shoulder overall level of function to be approximately 60-75% of his contralateral shoulder   Per chart review, MRI of right shoulder demonstrates: "No rotator cuff tear seen mild tendinosis supraspinatus and infraspinatus  Degenerative changes in the [facility] joint with capsular thickening with mild indentation of the supraspinatus  Small amount of fluid in the long head of the biceps tendon sheath along increased signal intensity in the rotator interval, suspicious for adhesive capsulitis  Cystic changes in the anterior and inferior aspect of the glenoid with associated the globular appearance of the anteroinferior labrum sequela of chronic anteroinferior labral injury "  Pain  Current pain rating: 3  At best pain ratin  At worst pain ratin  Location: right shoulder: anterior, superior, posterior  Quality: dull ache and sharp    Social Support    Employment status: working ()  Hand dominance: left      Diagnostic Tests  X-ray: normal (Right shoulder: "Mild osteoarthritis acromioclavicular joint ")  Treatments  Previous treatment: chiropractic  Current treatment: physical therapy  Patient Goals  Patient goal: avoid surgery, decrease pain, improve mobility        Objective     Tenderness     Right Shoulder  Tenderness in the bicipital groove       Active Range of Motion   Left Shoulder   Flexion: 171 degrees   Abduction: 172 degrees   External rotation BTH: T4   Internal rotation BTB: T8     Right Shoulder   Flexion: 155 degrees with pain  Abduction: 167 degrees with pain  External rotation BTH: T3 with pain  Internal rotation BTB: T11 with pain    Passive Range of Motion   Left Shoulder   Flexion: 175 degrees   Abduction: 175 degrees   External rotation 90°: 105 degrees   Internal rotation 90°: 58 degrees     Right Shoulder   Flexion: 152 degrees with pain  Abduction: 165 degrees with pain  External rotation 90°: 95 degrees with pain  Internal rotation 90°: 34 degrees     Scapular Mobility   Left Shoulder   Scapular Dyskinesis: none  Scapular mobility: WFL    Right Shoulder   Scapular Dyskinesis: none  Scapular mobility: [facility]    Joint Play   Left Shoulder  Joints within functional limits are the anterior capsule, posterior capsule and inferior capsule  Right Shoulder  Joints within functional limits are the anterior capsule  Hypomobile in the posterior capsule and inferior capsule  Strength/Myotome Testing     Left Shoulder     Planes of Motion   Flexion: 5   Abduction: 5   External rotation at 0°: 5   Internal rotation at 0°: 5     Isolated Muscles   Lower trapezius: 4   Middle trapezius: 4     Right Shoulder     Planes of Motion   Flexion: 4   Abduction: 4+   External rotation at 0°: 4   Internal rotation at 0°: 5     Isolated Muscles   Lower trapezius: 4-   Middle trapezius: 4     Left Elbow   Flexion: 5  Extension: 5    Right Elbow   Flexion: 5  Extension: 5    Left Wrist/Hand   Wrist extension: 5  Wrist flexion: 5    Right Wrist/Hand   Wrist extension: 5  Wrist flexion: 5    Tests     Right Shoulder   Positive Hawkin's, Neer's and scapular assistance   Precautions: standard      Manuals 4/5            Right shoulder PROM             Gr  II-IV GHJ AP, Inf mobs             Posterior capsule str  Gr  II-IV inf ACJ mobs in s/l             Neuro Re-Ed                                                                                                        Ther Ex             UBE             Sleeper str  : standing vs s/l Reviewed            Posterior capsule str   Reviewed            Prone shoulder ext, abd, scaption             Seated self inf GHJ distraction             Webslide: low row             TB ER             Bilateral ER with scapular retraction                          Ther Activity                                       Gait Training                                       Modalities

## 2021-04-08 ENCOUNTER — APPOINTMENT (OUTPATIENT)
Dept: PHYSICAL THERAPY | Facility: CLINIC | Age: 51
End: 2021-04-08
Payer: COMMERCIAL

## 2021-04-11 ENCOUNTER — TELEPHONE (OUTPATIENT)
Dept: FAMILY MEDICINE CLINIC | Facility: CLINIC | Age: 51
End: 2021-04-11

## 2021-04-12 ENCOUNTER — OFFICE VISIT (OUTPATIENT)
Dept: PHYSICAL THERAPY | Facility: CLINIC | Age: 51
End: 2021-04-12
Payer: COMMERCIAL

## 2021-04-12 DIAGNOSIS — M75.01 ADHESIVE CAPSULITIS OF RIGHT SHOULDER: Primary | ICD-10-CM

## 2021-04-12 PROCEDURE — 97110 THERAPEUTIC EXERCISES: CPT | Performed by: PHYSICAL THERAPIST

## 2021-04-12 PROCEDURE — 97140 MANUAL THERAPY 1/> REGIONS: CPT | Performed by: PHYSICAL THERAPIST

## 2021-04-12 NOTE — PROGRESS NOTES
Daily Note     Today's date: 2021  Patient name: Wu Black  : 1970  MRN: [id number]  Referring provider: Aminah Ibrahim PA-C  Dx:   Encounter Diagnosis     ICD-10-CM    1  Adhesive capsulitis of right shoulder  M75 01        Start Time: 2553  Stop Time: 164  Total time in clinic (min): 75 minutes    Subjective: Patient reports that he has been focusing on performing his stretches at home and his shoulder has been feeling a little better  Objective: See treatment diary below      Assessment: Tolerated treatment well  Patient demonstrated fatigue post treatment and would benefit from continued PT  Patient reported increased muscular soreness at end of session without increase in pain  Plan: Continue per plan of care  Progress treatment as tolerated  Precautions: standard      Manuals            Right shoulder PROM  GR           Gr  II-IV GHJ AP, Inf mobs  GR           Posterior capsule str  Gr  II-IV inf ACJ mobs in s/l  GR           Neuro Re-Ed                                                                                                        Ther Ex             UBE  4' / 4'           Sleeper str  : standing vs s/l Reviewed S/l 15x10"           Posterior capsule str  Reviewed 15x10"           Prone shoulder ext, abd, scaption             Seated self inf GHJ distraction             Webslide: low row  GTB 2x10 3"           TB ER  GTB 2x10           Bilateral ER with scapular retraction  GTB 2x10 5"           Multidirectional ball stability on wall (V, H, CW/CCW circles)  Green 20x ea             Ther Activity                                       Gait Training                                       Modalities

## 2021-04-14 ENCOUNTER — APPOINTMENT (OUTPATIENT)
Dept: PHYSICAL THERAPY | Facility: CLINIC | Age: 51
End: 2021-04-14
Payer: COMMERCIAL

## 2021-04-15 ENCOUNTER — OFFICE VISIT (OUTPATIENT)
Dept: PHYSICAL THERAPY | Facility: CLINIC | Age: 51
End: 2021-04-15
Payer: COMMERCIAL

## 2021-04-15 DIAGNOSIS — M75.01 ADHESIVE CAPSULITIS OF RIGHT SHOULDER: Primary | ICD-10-CM

## 2021-04-15 PROCEDURE — 97140 MANUAL THERAPY 1/> REGIONS: CPT | Performed by: PHYSICAL THERAPIST

## 2021-04-15 PROCEDURE — 97110 THERAPEUTIC EXERCISES: CPT | Performed by: PHYSICAL THERAPIST

## 2021-04-15 NOTE — PROGRESS NOTES
Daily Note     Today's date: 4/15/2021  Patient name: Peg Wadsworth  : 1970  MRN: [id number]  Referring provider: Ceci Anderson PA-C  Dx:   Encounter Diagnosis     ICD-10-CM    1  Adhesive capsulitis of right shoulder  M75 01        Start Time: 1520  Stop Time: 1620  Total time in clinic (min): 60 minutes    Subjective: Patient reports having no increased pain following his previous treatment session  Objective: See treatment diary below      Assessment: Tolerated treatment well  Patient demonstrated fatigue post treatment and would benefit from continued PT  Patient with gradually improving right shoulder PROM in all planes with minimal discomfort  Plan: Continue per plan of care  Progress treatment as tolerated  Precautions: standard      Manuals 4/5 4/12 4/15          Right shoulder PROM  GR GR          Gr  II-IV GHJ AP, Inf mobs  GR GR          Posterior capsule str  Gr  II-IV inf ACJ mobs in s/l  GR GR          Neuro Re-Ed                                                                                                        Ther Ex             UBE  4' / 4' 4' / 4'          Sleeper str  : standing vs s/l Reviewed S/l 15x10" 10x10" s/l          Posterior capsule str  Reviewed 15x10" 10x10"          Prone shoulder ext, abd, scaption   HEP          Seated self inf GHJ distraction             Webslide: low row  GTB 2x10 3" GTB 3x10 3"          TB ER  GTB 2x10 GTB 3x10          Bilateral ER with scapular retraction  GTB 2x10 5" GTB 3x10 5"          Multidirectional ball stability on wall (V, H, CW/CCW circles)  Green 20x ea  Green 20x ea            Ther Activity                                       Gait Training                                       Modalities

## 2021-04-19 ENCOUNTER — OFFICE VISIT (OUTPATIENT)
Dept: PHYSICAL THERAPY | Facility: CLINIC | Age: 51
End: 2021-04-19
Payer: COMMERCIAL

## 2021-04-19 DIAGNOSIS — M75.01 ADHESIVE CAPSULITIS OF RIGHT SHOULDER: Primary | ICD-10-CM

## 2021-04-19 PROCEDURE — 97112 NEUROMUSCULAR REEDUCATION: CPT | Performed by: PHYSICAL THERAPIST

## 2021-04-19 PROCEDURE — 97110 THERAPEUTIC EXERCISES: CPT | Performed by: PHYSICAL THERAPIST

## 2021-04-19 PROCEDURE — 97140 MANUAL THERAPY 1/> REGIONS: CPT | Performed by: PHYSICAL THERAPIST

## 2021-04-19 NOTE — PROGRESS NOTES
Daily Note     Today's date: 2021  Patient name: Mariama Cui  : 1970  MRN: [id number]  Referring provider: Amanda Shearer PA-C  Dx:   Encounter Diagnosis     ICD-10-CM    1  Adhesive capsulitis of right shoulder  M75 01        Start Time: 1518  Stop Time: 1615  Total time in clinic (min): 57 minutes    Subjective: Patient reports that he has not been as compliant with his HEP stretches, but has not noticed a decline in function  Objective: See treatment diary below      Assessment: Tolerated treatment well  Patient demonstrated fatigue post treatment and would benefit from continued PT  Patient with improving right shoulder ROM in all planes with minimal discomfort  Patient with improving strength, mild RC weakness noted  Plan: Continue per plan of care  Progress treatment as tolerated  Precautions: standard      Manuals 4/5 4/12 4/15 4/19         Right shoulder PROM  GR GR GR         Gr  II-IV GHJ AP, Inf mobs  GR GR GR         Posterior capsule str  Gr  II-IV inf ACJ mobs in s/l  GR GR GR         Neuro Re-Ed             BodyBlade: 90 deg elb flex IR/ER at side    3x30"         BodyBlade: 90 deg FF    5x15"                                                                          Ther Ex             UBE  4' / 4' 4' / 4' 4' / 4'         Sleeper str  : standing vs s/l Reviewed S/l 15x10" 10x10" s/l 10x10" s/l         Posterior capsule str  Reviewed 15x10" 10x10" 10x10"         Prone shoulder ext, abd, scaption   HEP          Seated self inf GHJ distraction             Webslide: low row  GTB 2x10 3" GTB 3x10 3"          TB ER  GTB 2x10 GTB 3x10 RTB 3x10         Bilateral ER with scapular retraction  GTB 2x10 5" GTB 3x10 5"          Multidirectional ball stability on wall (V, H, CW/CCW circles)  Green 20x ea  Green 20x ea            Standing horizontal abduction with TB    RTB 3x10         Scaption wall sllide    GTB 3x10         Spidermans    YTB 3x5 Ther Activity                                       Gait Training                                       Modalities

## 2021-04-20 DIAGNOSIS — L60.0 INGROWN TOENAIL OF BOTH FEET: Primary | ICD-10-CM

## 2021-04-21 ENCOUNTER — APPOINTMENT (OUTPATIENT)
Dept: PHYSICAL THERAPY | Facility: CLINIC | Age: 51
End: 2021-04-21
Payer: COMMERCIAL

## 2021-04-26 ENCOUNTER — APPOINTMENT (OUTPATIENT)
Dept: PHYSICAL THERAPY | Facility: CLINIC | Age: 51
End: 2021-04-26
Payer: COMMERCIAL

## 2021-04-28 ENCOUNTER — APPOINTMENT (OUTPATIENT)
Dept: PHYSICAL THERAPY | Facility: CLINIC | Age: 51
End: 2021-04-28
Payer: COMMERCIAL

## 2021-04-28 RX ORDER — LIDOCAINE HYDROCHLORIDE 10 MG/ML
2 INJECTION, SOLUTION INFILTRATION; PERINEURAL
Status: COMPLETED | OUTPATIENT
Start: 2021-03-23 | End: 2021-03-23

## 2021-04-28 RX ORDER — BUPIVACAINE HYDROCHLORIDE 2.5 MG/ML
2 INJECTION, SOLUTION INFILTRATION; PERINEURAL
Status: COMPLETED | OUTPATIENT
Start: 2021-03-23 | End: 2021-03-23

## 2021-04-28 RX ORDER — METHYLPREDNISOLONE ACETATE 40 MG/ML
2 INJECTION, SUSPENSION INTRA-ARTICULAR; INTRALESIONAL; INTRAMUSCULAR; SOFT TISSUE
Status: COMPLETED | OUTPATIENT
Start: 2021-03-23 | End: 2021-03-23

## 2021-05-03 ENCOUNTER — EVALUATION (OUTPATIENT)
Dept: PHYSICAL THERAPY | Facility: CLINIC | Age: 51
End: 2021-05-03
Payer: COMMERCIAL

## 2021-05-03 DIAGNOSIS — M75.01 ADHESIVE CAPSULITIS OF RIGHT SHOULDER: Primary | ICD-10-CM

## 2021-05-03 PROCEDURE — 97140 MANUAL THERAPY 1/> REGIONS: CPT | Performed by: PHYSICAL THERAPIST

## 2021-05-03 PROCEDURE — 97110 THERAPEUTIC EXERCISES: CPT | Performed by: PHYSICAL THERAPIST

## 2021-05-03 NOTE — PROGRESS NOTES
PT Re-Evaluation     Today's date: 5/3/2021  Patient name: Africa Pederson  : 1970  MRN: [id number]  Referring provider: Delia Henson PA-C  Dx:   Encounter Diagnosis     ICD-10-CM    1  Adhesive capsulitis of right shoulder  M75 01        Start Time: 98  Stop Time: 160  Total time in clinic (min): 50 minutes    Assessment  Assessment details: Since beginning physical therapy, Karuna Santos has attended a total number of 5 visits and has maintained fair compliance with established POC  Patient has made significant improvements in all areas, including decreased pain, increased strength, increased ROM, improved flexibility, improved joint mobility and improved overall level of function  Patient is reporting improved ability to perform a/iadls, recreational activities and engaging in social activities  Despite these improvements, Patient continues to present with pain and decreased strength  Patient is independent with comprehensive HEP  Anticipate D/C at [place] pending outcome of f/u appointment with ortho  Impairments: abnormal muscle firing, abnormal or restricted ROM, activity intolerance, impaired physical strength, lacks appropriate home exercise program, pain with function and poor posture   Understanding of Dx/Px/POC: excellent  Goals  Short Term Goals: to be achieved by 4 weeks  1) Patient to be independent with basic HEP  2) Decrease pain to 4/10 at it's worst   3) Increase UE strength by 1/2 MMT grade in all deficient planes  4) Increase UE ROM by > 5 deg in all deficient planes  5) Patient to report decreased sleep interruption secondary to pain  Long Term Goals: to be achieved by discharge  1) FOTO equal to or greater than TBD  2) Patient to be independent with comprehensive HEP  3) Abolish pain for improved quality of life  4) Increase UE strength to 5/5 MMT grade in all deficient planes to improve a/iadls  5) Increase UE ROM to within 5 deg of contralateral UE to improve a/iadls    6) Patient to report no sleep interruption secondary to pain  Plan  Patient would benefit from: skilled PT  Planned modality interventions: biofeedback, cryotherapy, hydrotherapy, unattended electrical stimulation, thermotherapy: hydrocollator packs and low level laser therapy  Planned therapy interventions: activity modification, ADL retraining, behavior modification, body mechanics training, functional ROM exercises, home exercise program, IADL retraining, joint mobilization, manual therapy, massage, neuromuscular re-education, patient education, postural training, strengthening, stretching, therapeutic activities and therapeutic exercise  Frequency: 1-2x week  Duration in weeks: 2  Plan of Care beginning date: 5/3/2021  Plan of Care expiration date: 2021  Treatment plan discussed with: patient        Subjective Evaluation    History of Present Illness  Mechanism of injury: Patient reports that his right shoulder mobility has improved  Patient is able to take off his shirts with greater ease and reaching behind his back with less pain  Patient is able to reach above shoulder height with less difficulty  Patient did have weakness in his right shoulder after using his leaf blower for approximately 15 minutes  Patient's quality of sleep is better, but is a little interrupted  Patient tends to lay on his left side more than his right  Patient estimates his right shoulder overall level of function to be approximately 85% of his premorbid norm    Pain  Current pain ratin  At best pain ratin  At worst pain rating: 3  Location: right shoulder: deep  Quality: dull ache    Treatments  Current treatment: physical therapy  Patient Goals  Patient goal: avoid surgery, decrease pain, improve mobility        Objective     Active Range of Motion   Left Shoulder   Flexion: 171 degrees   Abduction: 172 degrees   External rotation BTH: T4   Internal rotation BTB: T8     Right Shoulder   Flexion: 169 degrees   Abduction: 169 degrees   External rotation BTH: T4   Internal rotation BTB: T8 with pain    Passive Range of Motion   Left Shoulder   Flexion: 175 degrees   Abduction: 175 degrees   External rotation 90°: 105 degrees   Internal rotation 90°: 58 degrees     Right Shoulder   Flexion: 170 degrees   Abduction: 175 degrees   External rotation 90°: 100 degrees with pain  Internal rotation 90°: 60 degrees     Scapular Mobility   Left Shoulder   Scapular Dyskinesis: none  Scapular mobility: WFL    Right Shoulder   Scapular Dyskinesis: none  Scapular mobility: [facility]    Joint Play   Left Shoulder  Joints within functional limits are the anterior capsule, posterior capsule and inferior capsule  Right Shoulder  Joints within functional limits are the anterior capsule, posterior capsule and inferior capsule  Strength/Myotome Testing     Left Shoulder     Planes of Motion   Flexion: 5   Abduction: 5   External rotation at 0°: 5   Internal rotation at 0°: 5     Isolated Muscles   Lower trapezius: 4   Middle trapezius: 4     Right Shoulder     Planes of Motion   Flexion: 5   Abduction: 5   External rotation at 0°: 5   Internal rotation at 0°: 5     Isolated Muscles   Lower trapezius: 4+   Middle trapezius: 4+     Left Elbow   Flexion: 5  Extension: 5    Right Elbow   Flexion: 5  Extension: 5    Left Wrist/Hand   Wrist extension: 5  Wrist flexion: 5    Right Wrist/Hand   Wrist extension: 5  Wrist flexion: 5      Flowsheet Rows      Most Recent Value   PT/OT G-Codes   Current Score  59   Projected Score  72             Plan: Continue per plan of care  Progress treatment as tolerated  Precautions: standard      Manuals 4/5 4/12 4/15 4/19 5/3        Right shoulder PROM  GR GR GR GR        Gr  II-IV GHJ AP, Inf mobs  GR GR GR         Posterior capsule str               Gr  II-IV inf ACJ mobs in s/l  GR GR GR         Reevaluation     GR        Neuro Re-Ed             BodyBlade: 90 deg elb flex IR/ER at side    3x30"         BodyBlade: 90 deg FF    5x15"                                                                          Ther Ex             UBE  4' / 4' 4' / 4' 4' / 4' 4' / 4'        Sleeper str  : standing vs s/l Reviewed S/l 15x10" 10x10" s/l 10x10" s/l         Posterior capsule str  Reviewed 15x10" 10x10" 10x10"         Prone shoulder ext, abd, scaption   HEP          Seated self inf GHJ distraction             Webslide: low row  GTB 2x10 3" GTB 3x10 3"          TB ER  GTB 2x10 GTB 3x10 RTB 3x10         Bilateral ER with scapular retraction  GTB 2x10 5" GTB 3x10 5"          Multidirectional ball stability on wall (V, H, CW/CCW circles)  Green 20x ea  Green 20x ea            Standing horizontal abduction with TB    RTB 3x10         Scaption wall sllide    GTB 3x10         Spidermans    YTB 3x5         Patient education: HEP review     GR        Ther Activity                                       Gait Training                                       Modalities

## 2021-05-04 ENCOUNTER — OFFICE VISIT (OUTPATIENT)
Dept: OBGYN CLINIC | Facility: CLINIC | Age: 51
End: 2021-05-04
Payer: COMMERCIAL

## 2021-05-04 VITALS
BODY MASS INDEX: 28.17 KG/M2 | DIASTOLIC BLOOD PRESSURE: 74 MMHG | WEIGHT: 165 LBS | RESPIRATION RATE: 18 BRPM | HEART RATE: 61 BPM | HEIGHT: 64 IN | SYSTOLIC BLOOD PRESSURE: 112 MMHG

## 2021-05-04 DIAGNOSIS — M75.01 ADHESIVE CAPSULITIS OF RIGHT SHOULDER: Primary | ICD-10-CM

## 2021-05-04 PROCEDURE — 99213 OFFICE O/P EST LOW 20 MIN: CPT | Performed by: ORTHOPAEDIC SURGERY

## 2021-05-04 PROCEDURE — 3008F BODY MASS INDEX DOCD: CPT | Performed by: ORTHOPAEDIC SURGERY

## 2021-05-04 PROCEDURE — 1036F TOBACCO NON-USER: CPT | Performed by: ORTHOPAEDIC SURGERY

## 2021-05-04 RX ORDER — IBUPROFEN 800 MG/1
800 TABLET ORAL EVERY 8 HOURS PRN
Qty: 30 TABLET | Refills: 0 | Status: SHIPPED | OUTPATIENT
Start: 2021-05-04

## 2021-05-04 NOTE — PROGRESS NOTES
Patient Name:  Bhargavi Stokes  MRN:  [id number]    [address] [place], [place] [address]     1  Adhesive capsulitis of right shoulder  -     ibuprofen (MOTRIN) 800 mg tablet; Take 1 tablet (800 mg total) by mouth every 8 (eight) hours as needed for mild pain          Patient doing very well since his last visit and notes significant improvement in pain and range of motion  At this time he states he has 1 more physical therapy session and plans to continue with home exercises afterwards  I have encouraged the patient to keep up with his home exercise program   I have given him a new prescription for ibuprofen to be taken as needed for pain if pain recurs  I will plan to see the patient back on an as-needed basis if he has any worsening of his symptoms  History of the Present Illness   Bhargavi Stokes is a 52 y o  male  Here for follow-up of right shoulder  He reports approximately 85% improvement since corticosteroid injection and physical therapy and home exercise program since his last visit  He denies any new symptoms  Range of motion has improved significantly  He is happy to this point and rarely taking ibuprofen  No other new complaints  Review of Systems     Review of Systems   Constitutional: Negative for appetite change and unexpected weight change  HENT: Negative for congestion and trouble swallowing  Eyes: Negative for visual disturbance  Respiratory: Negative for cough and shortness of breath  Cardiovascular: Negative for chest pain and palpitations  Gastrointestinal: Negative for nausea and vomiting  Endocrine: Negative for cold intolerance and heat intolerance  Musculoskeletal: Negative for gait problem and myalgias  Skin: Negative for rash  Neurological: Negative for numbness  Physical Exam     /74   Pulse 61   Resp 18   Ht 5' 3 5" (1 613 m)   Wt 74 8 kg (165 lb)   BMI 28 77 kg/m²     Right shoulder:  Active range of motion to 1750 degrees forward flexion with pain, 170 degrees abduction with pain,  90 degrees external rotation and internal rotation  restricted 1 level compared to the contralateral side  There is no tenderness about the proximal biceps tendon, greater tuberosity, or shoulder girdle muscles  Empty can testing is negative  There is 5/5 strength with external rotation testing at the side  Belly press test is negative  Emmanuel test isnegative  [place]'s test is negative  Speed's test is negative  There is no tenderness about the rhomboid and trapezial musculature  The patient is neurovascularly intact distally in the extremity    Data Review     I have personally reviewed pertinent films in PACS, and my interpretation follows  No new imaging reviewed today  Social History     Tobacco Use    Smoking status: Never Smoker    Smokeless tobacco: Never Used    Tobacco comment: Former Smoke as per [facility]  Substance Use Topics    Alcohol use:  Yes     Alcohol/week: 2 0 standard drinks     Types: 2 Standard drinks or equivalent per week     Frequency: 2-3 times a week     Comment: socially    Drug use: Never           Procedures    Elie Uriarte, DO

## 2021-05-05 ENCOUNTER — OFFICE VISIT (OUTPATIENT)
Dept: PHYSICAL THERAPY | Facility: CLINIC | Age: 51
End: 2021-05-05
Payer: COMMERCIAL

## 2021-05-05 DIAGNOSIS — M75.01 ADHESIVE CAPSULITIS OF RIGHT SHOULDER: Primary | ICD-10-CM

## 2021-05-05 PROCEDURE — 97110 THERAPEUTIC EXERCISES: CPT | Performed by: PHYSICAL THERAPIST

## 2021-05-05 NOTE — PROGRESS NOTES
PT Discharge    Today's date: 2021  Patient name: Meghana Lynn  : 1970  MRN: [id number]  Referring provider: Any Deluna PA-C  Dx:   Encounter Diagnosis     ICD-10-CM    1  Adhesive capsulitis of right shoulder  M75 01        Start Time: 1512  Stop Time: 1625  Total time in clinic (min): 73 minutes    Assessment  Assessment details: Since beginning physical therapy, Jazzmine Palomo has attended a total number of 6 visits and has maintained excellent compliance with established POC  Patient has made significant improvements in all areas, including decreased pain, increased strength, increased ROM, improved flexibility, improved joint mobility, improved postural awareness and improved overall level of function  Patient is reporting improved ability to perform a/iadls, recreational activities and engaging in social activities  Patient is independent with comprehensive HEP  Patient has been instructed to contact PT if he begins to notice a decline in function or has any questions or concerns  Patient will be discharged at this time  Patient is in agreement with plan  Understanding of Dx/Px/POC: excellent  Goals  Short Term Goals: to be achieved by 4 weeks ALL GOALS MET  1) Patient to be independent with basic HEP  2) Decrease pain to 4/10 at it's worst   3) Increase UE strength by 1/2 MMT grade in all deficient planes  4) Increase UE ROM by > 5 deg in all deficient planes  5) Patient to report decreased sleep interruption secondary to pain  Long Term Goals: to be achieved by discharge  1) FOTO equal to or greater than 72  MET  2) Patient to be independent with comprehensive HEP  MET  3) Abolish pain for improved quality of life  PROGRESSED, BUT NOT MET  4) Increase UE strength to 5/5 MMT grade in all deficient planes to improve a/iadls  MET  5) Increase UE ROM to within 5 deg of contralateral UE to improve a/iadls  MET  6) Patient to report no sleep interruption secondary to pain  MET    Plan  Planned therapy interventions: home exercise program  Duration in visits: 1  Plan of Care beginning date: 2021  Plan of Care expiration date: 2021  Treatment plan discussed with: patient        Subjective Evaluation    History of Present Illness  Mechanism of injury: Patient reports that his right shoulder mobility has improved  Patient is able to take off his shirts with greater ease and reaching behind his back with less pain  Patient is able to reach above shoulder height with less difficulty  Patient did have weakness in his right shoulder after using his leaf blower for approximately 15 minutes  Patient's quality of sleep is better, but is a little interrupted  Patient tends to lay on his left side more than his right  Patient estimates his right shoulder overall level of function to be approximately 85% of his premorbid norm    Pain  Current pain ratin  At best pain ratin  At worst pain rating: 3  Location: right shoulder: deep  Quality: dull ache    Treatments  Current treatment: physical therapy  Patient Goals  Patient goal: avoid surgery, decrease pain, improve mobility        Objective     Active Range of Motion   Left Shoulder   Flexion: 171 degrees   Abduction: 172 degrees   External rotation BTH: T4   Internal rotation BTB: T8     Right Shoulder   Flexion: 169 degrees   Abduction: 169 degrees   External rotation BTH: T4   Internal rotation BTB: T8 with pain    Passive Range of Motion   Left Shoulder   Flexion: 175 degrees   Abduction: 175 degrees   External rotation 90°: 105 degrees   Internal rotation 90°: 58 degrees     Right Shoulder   Flexion: 170 degrees   Abduction: 175 degrees   External rotation 90°: 100 degrees with pain  Internal rotation 90°: 60 degrees     Scapular Mobility   Left Shoulder   Scapular Dyskinesis: none  Scapular mobility: WFL    Right Shoulder   Scapular Dyskinesis: none  Scap[facility]: [facility]    Joint Play   Left Shoulder  Joints within functional limits are the anterior capsule, posterior capsule and inferior capsule  Right Shoulder  Joints within functional limits are the anterior capsule, posterior capsule and inferior capsule  Strength/Myotome Testing     Left Shoulder     Planes of Motion   Flexion: 5   Abduction: 5   External rotation at 0°: 5   Internal rotation at 0°: 5     Isolated Muscles   Lower trapezius: 4   Middle trapezius: 4     Right Shoulder     Planes of Motion   Flexion: 5   Abduction: 5   External rotation at 0°: 5   Internal rotation at 0°: 5     Isolated Muscles   Lower trapezius: 4+   Middle trapezius: 4+     Left Elbow   Flexion: 5  Extension: 5    Right Elbow   Flexion: 5  Extension: 5    Left Wrist/Hand   Wrist extension: 5  Wrist flexion: 5    Right Wrist/Hand   Wrist extension: 5  Wrist flexion: 5      Flowsheet Rows      Most Recent Value   PT/OT G-Codes   Current Score  87   Projected Score  72             Plan: Continue per plan of care  Progress treatment as tolerated  Precautions: standard      Manuals 4/5 4/12 4/15 4/19 5/3 5/5       Right shoulder PROM  GR GR GR GR        Gr  II-IV GHJ AP, Inf mobs  GR GR GR         Posterior capsule str  Gr  II-IV inf ACJ mobs in s/l  GR GR GR         Reevaluation     GR        Neuro Re-Ed             BodyBlade: 90 deg elb flex IR/ER at side    3x30"         BodyBlade: 90 deg FF    5x15"                                                                          Ther Ex             UBE  4' / 4' 4' / 4' 4' / 4' 4' / 4' 4' / 4'       Sleeper str  : standing vs s/l Reviewed S/l 15x10" 10x10" s/l 10x10" s/l  Reviewed       Posterior capsule str   Reviewed 15x10" 10x10" 10x10"  Reviewed       Prone shoulder ext, abd, scaption   HEP   Reviewed       Seated self inf GHJ distraction             Webslide: low row  GTB 2x10 3" GTB 3x10 3"   Reviewed       TB ER  GTB 2x10 GTB 3x10 RTB 3x10  Reviewed       Bilateral ER with scapular retraction  GTB 2x10 5" GTB 3x10 5"   Reviewed Multidirectional ball stability on wall (V, H, CW/CCW circles)  Green 20x ea  Green 20x ea  Reviewed       Standing horizontal abduction with TB    RTB 3x10  RTB 3x10 5" with push-out       Scaption wall sllide    GTB 3x10  GTB 2x10       Spidermans    YTB 3x5  YTB 3x5       [place]: ext, h abd, scap, w's      10x5"       Patient education: HEP review     GR GR       Ther Activity                                       Gait Training                                       Modalities                                       Access Code: [email or web address]  URL: [email or web address]  Date: 05/05/2021  Prepared by: Alexia Wade    Exercises  Sleeper Stretch - 1 x daily - 7 x weekly - 10 reps - 2 sets - 10 seconds hold  Standing Sleeper Stretch at [place] - 1 x daily - 7 x weekly - 10 reps - 2 sets - 10 seconds hold  Standing Shoulder Posterior Capsule Stretch - 1 x daily - 7 x weekly - 10 reps - 2 sets - 10 seconds hold  Shoulder extension with resistance - Neutral - 1 x daily - 4 x weekly - 10 reps - 3 sets - 3 seconds hold  Shoulder External Rotation with Anchored Resistance - 1 x daily - 4 x weekly - 10 reps - 3 sets  Shoulder External Rotation and Scapular Retraction with Resistance - 1 x daily - 4 x weekly - 10 reps - 3 sets - 5 seconds hold  Standing Wall [employer] with Mini Swiss Ball - 1 x daily - 4 x weekly - 10 reps - 3 sets  Shoulder Horizontal Abduction with Anchored Resistance - 1 x daily - 4 x weekly - 10 reps - 3 sets  Scaption Wall Slide with Towel - 1 x daily - 4 x weekly - 10 reps - 3 sets  Prone Shoulder Extension - 1 x daily - 4 x weekly - 2 sets - 10 reps - 5 seconds hold  Prone Scapular Retraction Y - 1 x daily - 4 x weekly - 2 sets - 10 reps - 5 seconds hold  Prone W Scapular Retraction - 1 x daily - 4 x weekly - 2 sets - 10 reps - 5 seconds hold  Prone Scapular Retraction Arms at Side - 1 x daily - 4 x weekly - 2 sets - 10 reps - 5 seconds hold

## 2021-11-10 ENCOUNTER — IMMUNIZATIONS (OUTPATIENT)
Dept: FAMILY MEDICINE CLINIC | Facility: HOSPITAL | Age: 51
End: 2021-11-10

## 2021-11-10 DIAGNOSIS — Z23 ENCOUNTER FOR IMMUNIZATION: Primary | ICD-10-CM

## 2021-11-10 PROCEDURE — 0013A COVID-19 MODERNA VACC 0.25 ML BOOSTER: CPT

## 2021-11-10 PROCEDURE — 91306 COVID-19 MODERNA VACC 0.25 ML BOOSTER: CPT

## 2021-11-18 ENCOUNTER — TELEPHONE (OUTPATIENT)
Dept: FAMILY MEDICINE CLINIC | Facility: CLINIC | Age: 51
End: 2021-11-18

## 2021-12-31 ENCOUNTER — APPOINTMENT (OUTPATIENT)
Dept: LAB | Facility: CLINIC | Age: 51
End: 2021-12-31
Payer: COMMERCIAL

## 2021-12-31 DIAGNOSIS — E78.00 HIGH CHOLESTEROL: ICD-10-CM

## 2021-12-31 DIAGNOSIS — Z11.59 ENCOUNTER FOR HEPATITIS C SCREENING TEST FOR LOW RISK PATIENT: ICD-10-CM

## 2021-12-31 DIAGNOSIS — Z13.1 SCREENING FOR DIABETES MELLITUS: ICD-10-CM

## 2021-12-31 PROCEDURE — 36415 COLL VENOUS BLD VENIPUNCTURE: CPT

## 2021-12-31 PROCEDURE — 80048 BASIC METABOLIC PNL TOTAL CA: CPT

## 2021-12-31 PROCEDURE — 86803 HEPATITIS C AB TEST: CPT

## 2021-12-31 PROCEDURE — 80061 LIPID PANEL: CPT

## 2022-01-01 LAB
ANION GAP SERPL CALCULATED.3IONS-SCNC: 4 MMOL/L (ref 4–13)
BUN SERPL-MCNC: 19 MG/DL (ref 5–25)
CALCIUM SERPL-MCNC: 9.3 MG/DL (ref 8.3–10.1)
CHLORIDE SERPL-SCNC: 102 MMOL/L (ref 100–108)
CHOLEST SERPL-MCNC: 283 MG/DL
CO2 SERPL-SCNC: 31 MMOL/L (ref 21–32)
CREAT SERPL-MCNC: 0.82 MG/DL (ref 0.6–1.3)
GFR SERPL CREATININE-BSD FRML MDRD: 103 ML/MIN/1.73SQ M
GLUCOSE P FAST SERPL-MCNC: 92 MG/DL (ref 65–99)
HCV AB SER QL: NORMAL
HDLC SERPL-MCNC: 45 MG/DL
LDLC SERPL CALC-MCNC: 194 MG/DL (ref 0–100)
POTASSIUM SERPL-SCNC: 4.6 MMOL/L (ref 3.5–5.3)
SODIUM SERPL-SCNC: 137 MMOL/L (ref 136–145)
TRIGL SERPL-MCNC: 219 MG/DL

## 2022-01-13 ENCOUNTER — OFFICE VISIT (OUTPATIENT)
Dept: FAMILY MEDICINE CLINIC | Facility: CLINIC | Age: 52
End: 2022-01-13
Payer: COMMERCIAL

## 2022-01-13 VITALS
HEART RATE: 74 BPM | OXYGEN SATURATION: 98 % | WEIGHT: 171.2 LBS | HEIGHT: 64 IN | TEMPERATURE: 97.8 F | BODY MASS INDEX: 29.23 KG/M2 | DIASTOLIC BLOOD PRESSURE: 80 MMHG | SYSTOLIC BLOOD PRESSURE: 118 MMHG

## 2022-01-13 DIAGNOSIS — E78.00 HIGH CHOLESTEROL: ICD-10-CM

## 2022-01-13 DIAGNOSIS — J30.9 ALLERGIC RHINITIS, UNSPECIFIED SEASONALITY, UNSPECIFIED TRIGGER: ICD-10-CM

## 2022-01-13 DIAGNOSIS — Z00.00 ANNUAL PHYSICAL EXAM: Primary | ICD-10-CM

## 2022-01-13 DIAGNOSIS — Z23 NEED FOR INFLUENZA VACCINATION: ICD-10-CM

## 2022-01-13 PROCEDURE — 90686 IIV4 VACC NO PRSV 0.5 ML IM: CPT | Performed by: FAMILY MEDICINE

## 2022-01-13 PROCEDURE — 1036F TOBACCO NON-USER: CPT | Performed by: FAMILY MEDICINE

## 2022-01-13 PROCEDURE — 3008F BODY MASS INDEX DOCD: CPT | Performed by: FAMILY MEDICINE

## 2022-01-13 PROCEDURE — 3725F SCREEN DEPRESSION PERFORMED: CPT | Performed by: FAMILY MEDICINE

## 2022-01-13 PROCEDURE — 90471 IMMUNIZATION ADMIN: CPT | Performed by: FAMILY MEDICINE

## 2022-01-13 PROCEDURE — 99396 PREV VISIT EST AGE 40-64: CPT | Performed by: FAMILY MEDICINE

## 2022-01-13 RX ORDER — ATORVASTATIN CALCIUM 10 MG/1
10 TABLET, FILM COATED ORAL DAILY
Qty: 90 TABLET | Refills: 1 | Status: SHIPPED | OUTPATIENT
Start: 2022-01-13 | End: 2022-04-14 | Stop reason: SDUPTHER

## 2022-01-13 RX ORDER — FLUTICASONE PROPIONATE 50 MCG
1 SPRAY, SUSPENSION (ML) NASAL DAILY
Qty: 48 ML | Refills: 1 | Status: SHIPPED | OUTPATIENT
Start: 2022-01-13 | End: 2022-04-06 | Stop reason: SDUPTHER

## 2022-01-13 NOTE — PATIENT INSTRUCTIONS

## 2022-01-13 NOTE — PROGRESS NOTES
ADULT ANNUAL [facility]    NAME: Cheri Sierra  AGE: 54 y o  SEX: male  : 1970     DATE: 2022     Assessment and Plan:     Problem List Items Addressed This Visit        Other    High cholesterol    Relevant Medications    atorvastatin (LIPITOR) 10 mg tablet    Other Relevant Orders    Lipid panel      Other Visit Diagnoses     Annual physical exam    -  Primary    Need for influenza vaccination        Relevant Orders    influenza vaccine, quadrivalent, 0 5 mL, preservative-free, for adult and pediatric patients 6 mos+ (AFLURIA, FLUARIX, FLULAVAL, FLUZONE) (Completed)    Allergic rhinitis, unspecified seasonality, unspecified trigger        Relevant Medications    fluticasone (FLONASE) 50 mcg/act nasal spray          Immunizations and preventive care screenings were discussed with patient today  Appropriate education was printed on patient's after visit summary  Counseling:  Alcohol/drug use: discussed moderation in alcohol intake, the recommendations for healthy alcohol use, and avoidance of illicit drug use  Dental Health: discussed importance of regular tooth brushing, flossing, and dental visits  · Exercise: the importance of regular exercise/physical activity was discussed  Recommend exercise 3-5 times per week for at least 30 minutes  · Start Atorvastatin and repeat labs in 3 months  Discussed common side effects  Return in about 3 months (around 2022)  Chief Complaint:     Chief Complaint   Patient presents with    Physical Exam     Patient seen in office today for a yearly physical - w/lab results and flu shot      History of Present Illness:     Adult Annual Physical   Patient here for a comprehensive physical exam  The patient reports problems - cholesterol is high  His diet has not been healthy and he has been less active  Not on meds but is agreeable to statin therapy    Needs refill on Flonase -uses it all year long  Diet and Physical Activity  · Diet/Nutrition: well balanced diet  · Exercise: moderate cardiovascular exercise  Depression Screening  PHQ-2/9 Depression Screening    Little interest or pleasure in doing things: 0 - not at all  Feeling down, depressed, or hopeless: 0 - not at all  PHQ-2 Score: 0  PHQ-2 Interpretation: Negative depression screen       General Health  · Sleep: sleeps well  · Hearing: no issues  · Vision: goes for regular eye exams and wears glasses  · Dental: regular dental visits and brushes teeth twice daily   Health   · Symptoms include: none     Review of Systems:     Review of Systems   Constitutional: Negative for activity change, appetite change, chills, fatigue, fever and unexpected weight change  HENT: Negative for congestion, ear discharge, ear pain, postnasal drip, sinus pressure and sore throat  Eyes: Negative for discharge and visual disturbance  Respiratory: Negative for cough, shortness of breath and wheezing  Cardiovascular: Negative for chest pain, palpitations and leg swelling  Gastrointestinal: Negative for abdominal pain, constipation, diarrhea, nausea and vomiting  Endocrine: Negative for cold intolerance, heat intolerance, polydipsia and polyuria  Genitourinary: Negative for difficulty urinating and frequency  Musculoskeletal: Negative for arthralgias, back pain, joint swelling and myalgias  Skin: Negative for rash  Allergic/Immunologic: Positive for environmental allergies  Neurological: Negative for dizziness, weakness, light-headedness, numbness and headaches  Hematological: Negative for adenopathy  Psychiatric/Behavioral: Negative for behavioral problems, confusion, dysphoric mood, sleep disturbance and suicidal ideas  The patient is not nervous/anxious         Past Medical History:     Past Medical History:   Diagnosis Date    Allergic     Anxiety     Seasonal allergies     Shoulder injury, right, sequela       Past Surgical History:     History reviewed  No pertinent surgical history  Family History:     Family History   Problem Relation Age of Onset   Kaity Field Breast cancer Mother     Hypertension Mother     No Known Problems Father       Social History:     Social History     Socioeconomic History    Marital status: /Civil Union     Spouse name: None    Number of children: None    Years of education: None    Highest education level: None   Occupational History    None   Tobacco Use    Smoking status: Never Smoker    Smokeless tobacco: Never Used    Tobacco comment: Former Smoke as per [employer]  Vaping Use    Vaping Use: Never used   Substance and Sexual Activity    Alcohol use:  Yes     Alcohol/week: 2 0 standard drinks     Types: 2 Standard drinks or equivalent per week     Comment: socially    Drug use: Never    Sexual activity: None   Other Topics Concern    None   Social History Narrative    None     Social Determinants of Health     Financial Resource Strain: Not on file   Food Insecurity: Not on file   Transportation Needs: Not on file   Physical Activity: Not on file   Stress: Not on file   Social Connections: Not on file   Intimate Partner Violence: Not on file   Housing Stability: Not on file      Current Medications:     Current Outpatient Medications   Medication Sig Dispense Refill    fluticasone (FLONASE) 50 mcg/act nasal spray 1 spray into each nostril daily 48 mL 1    glucosamine-chondroitin 500-400 MG tablet Take 1 tablet by mouth 3 (three) times a day      ibuprofen (MOTRIN) 800 mg tablet Take 1 tablet (800 mg total) by mouth every 8 (eight) hours as needed for mild pain 30 tablet 0    Misc Natural Products (TART CHERRY ADVANCED PO) Take by mouth      NON FORMULARY CBD [place] to [place] prn      traZODone (DESYREL) 100 mg tablet Take 1 tablet (100 mg total) by mouth daily at bedtime 90 tablet 3    Turmeric 500 MG TABS Take by mouth      atorvastatin (LIPITOR) 10 mg tablet Take 1 tablet (10 mg total) by mouth daily 90 tablet 1    Diclofenac Sodium (VOLTAREN) 1 % Apply 2 g topically 4 (four) times a day (Patient not taking: Reported on 1/13/2022 ) 1 Tube 0    triamcinolone (KENALOG) 0 5 % ointment Apply topically 2 (two) times a day 30 g 0     No current facility-administered medications for this visit  Allergies:     No Known Allergies   Physical Exam:     /80 (BP Location: Left arm, Patient Position: Sitting, Cuff Size: Standard)   Pulse 74   Temp 97 8 °F (36 6 °C)   Ht 5' 3 5" (1 613 m)   Wt 77 7 kg (171 lb 3 2 oz)   SpO2 98%   BMI 29 85 kg/m²     Physical Exam  Vitals and nursing note reviewed  Constitutional:       General: He is not in acute distress  Appearance: He is well-developed  He is not diaphoretic  HENT:      Head: Normocephalic and atraumatic  Right Ear: Tympanic membrane, ear canal and external ear normal       Left Ear: Tympanic membrane, ear canal and external ear normal    Eyes:      Conjunctiva/sclera: Conjunctivae normal    Cardiovascular:      Rate and Rhythm: Normal rate and regular rhythm  Heart sounds: Normal heart sounds  No murmur heard  No friction rub  No gallop  Pulmonary:      Effort: Pulmonary effort is normal  No respiratory distress  Breath sounds: Normal breath sounds  No stridor  No wheezing or rales  Chest:      Chest wall: No tenderness  Musculoskeletal:         General: No swelling  Right lower leg: No edema  Left lower leg: No edema  Skin:     Findings: No rash  Neurological:      General: No focal deficit present  Mental Status: He is alert  Mental status is at baseline  Cranial Nerves: No cranial nerve deficit  Psychiatric:         Mood and Affect: Mood normal          Behavior: Behavior normal          Thought Content:  Thought content normal          Judgment: Judgment normal           Giovanny Chapa MD  [address] [place] [address]

## 2022-04-06 ENCOUNTER — OFFICE VISIT (OUTPATIENT)
Dept: URGENT CARE | Facility: CLINIC | Age: 52
End: 2022-04-06
Payer: COMMERCIAL

## 2022-04-06 VITALS
HEART RATE: 74 BPM | TEMPERATURE: 98.6 F | RESPIRATION RATE: 14 BRPM | SYSTOLIC BLOOD PRESSURE: 104 MMHG | OXYGEN SATURATION: 97 % | DIASTOLIC BLOOD PRESSURE: 68 MMHG

## 2022-04-06 DIAGNOSIS — J30.9 ALLERGIC RHINITIS, UNSPECIFIED SEASONALITY, UNSPECIFIED TRIGGER: ICD-10-CM

## 2022-04-06 DIAGNOSIS — H65.03 NON-RECURRENT ACUTE SEROUS OTITIS MEDIA OF BOTH EARS: Primary | ICD-10-CM

## 2022-04-06 PROCEDURE — 99213 OFFICE O/P EST LOW 20 MIN: CPT | Performed by: PHYSICIAN ASSISTANT

## 2022-04-06 RX ORDER — FLUTICASONE PROPIONATE 50 MCG
1 SPRAY, SUSPENSION (ML) NASAL DAILY
Qty: 48 ML | Refills: 1 | Status: SHIPPED | OUTPATIENT
Start: 2022-04-06 | End: 2022-04-14 | Stop reason: SDUPTHER

## 2022-04-06 RX ORDER — FLUTICASONE PROPIONATE 50 MCG
1 SPRAY, SUSPENSION (ML) NASAL DAILY
Qty: 48 ML | Refills: 1 | Status: SHIPPED | OUTPATIENT
Start: 2022-04-06 | End: 2022-04-06

## 2022-04-06 NOTE — PROGRESS NOTES
[facility]        NAME: Manju Ellis is a 54 y o  male  : 1970    MRN: [id number]  DATE: 2022  TIME: 4:07 PM    Assessment and Plan   Non-recurrent acute serous otitis media of both ears [H65 03]  1  Non-recurrent acute serous otitis media of both ears     2  Allergic rhinitis, unspecified seasonality, unspecified trigger  fluticasone (FLONASE) 50 mcg/act nasal spray         Patient Instructions   Patient Instructions     Fluid In The Ear (Serous Otitis Media)   WHAT YOU NEED TO KNOW:   DEBORA is fluid trapped in the middle of your ear behind your eardrum  This condition usually develops without signs or symptoms of an ear infection  Serous otitis media may be caused by an upper respiratory infection or allergies  It is most common in the fall and early spring  DISCHARGE INSTRUCTIONS:   Call your doctor if:   · You develop severe ear pain  · The outside of your ear is red or swollen  · You have fluid coming from your ear  · You have questions or concerns about your condition or care  How to stay healthy:   · Wash your hands often throughout the day  Use soap and water  Rub your soapy hands together, lacing your fingers, for at least 20 seconds  Rinse with warm, running water  Dry your hands with a clean towel or paper towel  Use hand  that contains alcohol if soap and water are not available  Teach children how to wash their hands and use hand   · Avoid people who are sick  Some germs are easily and quickly spread through contact  Follow up with your doctor as directed:  Write down your questions so you remember to ask them during your visits  © Copyright [employer]  Information is for End User's use only and may not be sold, redistributed or otherwise used for commercial purposes   All illustrations and images included in CareNotes® are the copyrighted property of [employer]  or [facility] Omid Jacome   The above information is an  only  It is not intended as medical advice for individual conditions or treatments  Talk to your doctor, nurse or pharmacist before following any medical regimen to see if it is safe and effective for you  Follow up with PCP in 3-5 days  Proceed to  ER if symptoms worsen  Chief Complaint     Chief Complaint   Patient presents with   Gwen Hayden     started with left ear today and now feeling right ear pain  Ears feel clogged  No fever, Denies cold sx, congestion  History of Present Illness       Patient presents with bilateral ear pain starting today  Has had some congestion due to allergies recently but denies fevers, cough, worsening runny nose or congestion  No drainage from the ears  Review of Systems   Review of Systems   Constitutional: Negative for chills, fatigue and fever  HENT: Positive for congestion, ear pain and rhinorrhea  Negative for ear discharge, postnasal drip, sinus pressure, sinus pain and sore throat  Respiratory: Negative for cough, chest tightness, shortness of breath and wheezing  Cardiovascular: Negative for chest pain and palpitations  Musculoskeletal: Negative for arthralgias and myalgias  Neurological: Negative for weakness  Psychiatric/Behavioral: Negative for confusion           Current Medications       Current Outpatient Medications:     atorvastatin (LIPITOR) 10 mg tablet, Take 1 tablet (10 mg total) by mouth daily, Disp: 90 tablet, Rfl: 1    fluticasone (FLONASE) 50 mcg/act nasal spray, 1 spray into each nostril daily, Disp: 48 mL, Rfl: 1    glucosamine-chondroitin 500-400 MG tablet, Take 1 tablet by mouth 3 (three) times a day, Disp: , Rfl:     ibuprofen (MOTRIN) 800 mg tablet, Take 1 tablet (800 mg total) by mouth every 8 (eight) hours as needed for mild pain, Disp: 30 tablet, Rfl: 0    Misc Natural Products (TART CHERRY ADVANCED PO), Take by mouth, Disp: , Rfl:     NON FORMULARY, CBD [place] to [place] prn, Disp: , Rfl:   traZODone (DESYREL) 100 mg tablet, Take 1 tablet (100 mg total) by mouth daily at bedtime, Disp: 90 tablet, Rfl: 3    triamcinolone (KENALOG) 0 5 % ointment, Apply topically 2 (two) times a day, Disp: 30 g, Rfl: 0    Turmeric 500 MG TABS, Take by mouth, Disp: , Rfl:     Diclofenac Sodium (VOLTAREN) 1 %, Apply 2 g topically 4 (four) times a day (Patient not taking: Reported on 4/6/2022 ), Disp: 1 Tube, Rfl: 0    Current Allergies     Allergies as of 04/06/2022    (No Known Allergies)            The following portions of the patient's history were reviewed and updated as appropriate: allergies, current medications, past family history, past medical history, past social history, past surgical history and problem list      Past Medical History:   Diagnosis Date    Allergic     Anxiety     Seasonal allergies     Shoulder injury, right, sequela        History reviewed  No pertinent surgical history  Family History   Problem Relation Age of Onset   Rosaura Negrete Breast cancer Mother     Hypertension Mother     No Known Problems Father          Medications have been verified  Objective   /68   Pulse 74   Temp 98 6 °F (37 °C)   Resp 14   SpO2 97%        Physical Exam     Physical Exam  Constitutional:       Appearance: Normal appearance  HENT:      Head: Normocephalic and atraumatic  Right Ear: Tympanic membrane, ear canal and external ear normal       Left Ear: Tympanic membrane, ear canal and external ear normal       Ears:      Comments: Mild middle ear effusions noted  No erythema     Nose: Nose normal       Mouth/Throat:      Mouth: Mucous membranes are moist       Pharynx: Oropharynx is clear  Musculoskeletal:      Cervical back: No muscular tenderness  Neurological:      Mental Status: He is alert     Psychiatric:         Mood and Affect: Mood normal          Behavior: Behavior normal

## 2022-04-06 NOTE — PATIENT INSTRUCTIONS
Fluid In The Ear (Serous Otitis Media)   WHAT YOU NEED TO KNOW:   DEBORA is fluid trapped in the middle of your ear behind your eardrum  This condition usually develops without signs or symptoms of an ear infection  Serous otitis media may be caused by an upper respiratory infection or allergies  It is most common in the fall and early spring  DISCHARGE INSTRUCTIONS:   Call your doctor if:   · You develop severe ear pain  · The outside of your ear is red or swollen  · You have fluid coming from your ear  · You have questions or concerns about your condition or care  How to stay healthy:   · Wash your hands often throughout the day  Use soap and water  Rub your soapy hands together, lacing your fingers, for at least 20 seconds  Rinse with warm, running water  Dry your hands with a clean towel or paper towel  Use hand  that contains alcohol if soap and water are not available  Teach children how to wash their hands and use hand   · Avoid people who are sick  Some germs are easily and quickly spread through contact  Follow up with your doctor as directed:  Write down your questions so you remember to ask them during your visits  © Copyright [employer] 2022 Information is for End User's use only and may not be sold, redistributed or otherwise used for commercial purposes  All illustrations and images included in CareNotes® are the copyrighted property of [employer] Inc  or Anali Engel  The above information is an  only  It is not intended as medical advice for individual conditions or treatments  Talk to your doctor, nurse or pharmacist before following any medical regimen to see if it is safe and effective for you

## 2022-04-09 ENCOUNTER — APPOINTMENT (OUTPATIENT)
Dept: LAB | Facility: CLINIC | Age: 52
End: 2022-04-09
Payer: COMMERCIAL

## 2022-04-09 DIAGNOSIS — E78.00 HIGH CHOLESTEROL: ICD-10-CM

## 2022-04-09 LAB
CHOLEST SERPL-MCNC: 192 MG/DL
HDLC SERPL-MCNC: 34 MG/DL
LDLC SERPL CALC-MCNC: 96 MG/DL (ref 0–100)
NONHDLC SERPL-MCNC: 158 MG/DL
TRIGL SERPL-MCNC: 311 MG/DL

## 2022-04-09 PROCEDURE — 36415 COLL VENOUS BLD VENIPUNCTURE: CPT

## 2022-04-09 PROCEDURE — 80061 LIPID PANEL: CPT

## 2022-04-14 ENCOUNTER — OFFICE VISIT (OUTPATIENT)
Dept: FAMILY MEDICINE CLINIC | Facility: CLINIC | Age: 52
End: 2022-04-14
Payer: COMMERCIAL

## 2022-04-14 VITALS
HEART RATE: 78 BPM | SYSTOLIC BLOOD PRESSURE: 108 MMHG | BODY MASS INDEX: 29.23 KG/M2 | WEIGHT: 171.2 LBS | OXYGEN SATURATION: 98 % | DIASTOLIC BLOOD PRESSURE: 74 MMHG | TEMPERATURE: 97.9 F | HEIGHT: 64 IN

## 2022-04-14 DIAGNOSIS — Z12.11 ENCOUNTER FOR SCREENING COLONOSCOPY: ICD-10-CM

## 2022-04-14 DIAGNOSIS — J30.9 ALLERGIC RHINITIS, UNSPECIFIED SEASONALITY, UNSPECIFIED TRIGGER: ICD-10-CM

## 2022-04-14 DIAGNOSIS — F41.9 ANXIETY: ICD-10-CM

## 2022-04-14 DIAGNOSIS — F51.01 PRIMARY INSOMNIA: ICD-10-CM

## 2022-04-14 DIAGNOSIS — E78.00 HIGH CHOLESTEROL: Primary | ICD-10-CM

## 2022-04-14 DIAGNOSIS — E66.09 CLASS 1 OBESITY DUE TO EXCESS CALORIES WITHOUT SERIOUS COMORBIDITY WITH BODY MASS INDEX (BMI) OF 31.0 TO 31.9 IN ADULT: ICD-10-CM

## 2022-04-14 PROBLEM — M77.11 RIGHT LATERAL EPICONDYLITIS: Status: RESOLVED | Noted: 2021-01-04 | Resolved: 2022-04-14

## 2022-04-14 PROBLEM — S80.12XA HEMATOMA OF LEFT LOWER EXTREMITY: Status: RESOLVED | Noted: 2019-12-27 | Resolved: 2022-04-14

## 2022-04-14 PROCEDURE — 3008F BODY MASS INDEX DOCD: CPT | Performed by: NURSE PRACTITIONER

## 2022-04-14 PROCEDURE — 99214 OFFICE O/P EST MOD 30 MIN: CPT | Performed by: NURSE PRACTITIONER

## 2022-04-14 PROCEDURE — 1036F TOBACCO NON-USER: CPT | Performed by: NURSE PRACTITIONER

## 2022-04-14 RX ORDER — FLUTICASONE PROPIONATE 50 MCG
1 SPRAY, SUSPENSION (ML) NASAL DAILY
Qty: 54 ML | Refills: 1 | Status: SHIPPED | OUTPATIENT
Start: 2022-04-14 | End: 2022-07-13

## 2022-04-14 RX ORDER — ATORVASTATIN CALCIUM 10 MG/1
10 TABLET, FILM COATED ORAL DAILY
Qty: 90 TABLET | Refills: 2 | Status: SHIPPED | OUTPATIENT
Start: 2022-04-14

## 2022-04-14 RX ORDER — TRAZODONE HYDROCHLORIDE 100 MG/1
100 TABLET ORAL
Qty: 90 TABLET | Refills: 3 | Status: SHIPPED | OUTPATIENT
Start: 2022-04-14

## 2022-04-14 NOTE — PROGRESS NOTES
Assessment/Plan:           Problem List Items Addressed This Visit        Other    Class 1 obesity due to excess calories without serious comorbidity with body mass index (BMI) of 31 0 to 31 9 in adult    Primary insomnia     Trazodone 100 mg at night          Relevant Medications    traZODone (DESYREL) 100 mg tablet    High cholesterol - Primary     Lipitor 10 mg daily well controlled          Relevant Medications    atorvastatin (LIPITOR) 10 mg tablet    Other Relevant Orders    Lipid Panel with Direct LDL reflex    Comprehensive metabolic panel    Encounter for screening colonoscopy    Relevant Orders    Ambulatory referral for colonoscopy      Other Visit Diagnoses     Allergic rhinitis, unspecified seasonality, unspecified trigger        Relevant Medications    fluticasone (FLONASE) 50 mcg/act nasal spray    Anxiety        Relevant Medications    traZODone (DESYREL) 100 mg tablet            Subjective:      Patient ID: Axel Ocampo is a 54 y o  male  Patient here today to review his recent labs  Patient reports that he has no issues to report and reports that he is taking medication for his anxiety and is on the 100 mg and is taking at night and working well for him       The following portions of the patient's history were reviewed and updated as appropriate: BMI Counseling: Body mass index is 29 85 kg/m²  The BMI is above normal  Nutrition recommendations include decreasing portion sizes, encouraging healthy choices of fruits and vegetables, decreasing fast food intake, consuming healthier snacks, limiting drinks that contain sugar, moderation in carbohydrate intake, increasing intake of lean protein, reducing intake of saturated and trans fat and reducing intake of cholesterol  Exercise recommendations include moderate physical activity 150 minutes/week  No pharmacotherapy was ordered  Patient referred to PCP  Rationale for BMI follow-up plan is due to patient being overweight or obese         He  has a past medical history of Allergic, Anxiety, Seasonal allergies, and Shoulder injury, right, sequela  He   Patient Active Problem List    Diagnosis Date Noted    Encounter for screening colonoscopy 04/14/2022    High cholesterol 01/31/2020    Class 1 obesity due to excess calories without serious comorbidity with body mass index (BMI) of 31 0 to 31 9 in adult 01/03/2020    Primary insomnia 01/03/2020     He  has no past surgical history on file  His family history includes Breast cancer in his mother; Hypertension in his mother; No Known Problems in his father  He  reports that he has never smoked  He has never used smokeless tobacco  He reports current alcohol use of about 2 0 standard drinks of alcohol per week  He reports that he does not use drugs  Current Outpatient Medications   Medication Sig Dispense Refill    atorvastatin (LIPITOR) 10 mg tablet Take 1 tablet (10 mg total) by mouth daily 90 tablet 2    fluticasone (FLONASE) 50 mcg/act nasal spray 1 spray into each nostril daily 54 mL 1    glucosamine-chondroitin 500-400 MG tablet Take 1 tablet by mouth 3 (three) times a day      ibuprofen (MOTRIN) 800 mg tablet Take 1 tablet (800 mg total) by mouth every 8 (eight) hours as needed for mild pain 30 tablet 0    Misc Natural Products (TART CHERRY ADVANCED PO) Take by mouth      NON FORMULARY CBD [place] to [place] prn      traZODone (DESYREL) 100 mg tablet Take 1 tablet (100 mg total) by mouth daily at bedtime 90 tablet 3    Turmeric 500 MG TABS Take by mouth       No current facility-administered medications for this visit  He has No Known Allergies       Review of Systems   Constitutional: Negative for activity change, appetite change, chills, diaphoresis, fatigue, fever and unexpected weight change  HENT: Positive for postnasal drip and rhinorrhea  Negative for congestion, ear pain, hearing loss, sinus pressure, sinus pain, sneezing, sore throat, tinnitus, trouble swallowing and voice change  Eyes: Negative for pain, redness and visual disturbance  Respiratory: Negative for cough and shortness of breath  Cardiovascular: Negative for chest pain, palpitations and leg swelling  Gastrointestinal: Negative for abdominal pain, blood in stool, diarrhea, nausea and vomiting  Endocrine: Negative  Genitourinary: Negative  Musculoskeletal: Positive for arthralgias  Knees taking glucosamine    Skin: Negative  Allergic/Immunologic: Negative  Neurological: Negative for dizziness, seizures, syncope, speech difficulty, light-headedness, numbness and headaches  Hematological: Negative  Psychiatric/Behavioral: Negative for behavioral problems and dysphoric mood  Objective:      /74 (BP Location: Left arm, Patient Position: Sitting)   Pulse 78   Temp 97 9 °F (36 6 °C) (Temporal)   Ht 5' 3 5" (1 613 m)   Wt 77 7 kg (171 lb 3 2 oz)   SpO2 98%   BMI 29 85 kg/m²          Physical Exam  Vitals and nursing note reviewed  Constitutional:       General: He is not in acute distress  Appearance: He is well-developed  HENT:      Head: Normocephalic and atraumatic  Right Ear: Tympanic membrane normal       Left Ear: Tympanic membrane normal       Nose: Nose normal       Mouth/Throat:      Mouth: Mucous membranes are moist    Eyes:      Pupils: Pupils are equal, round, and reactive to light  Neck:      Thyroid: No thyromegaly  Cardiovascular:      Rate and Rhythm: Normal rate and regular rhythm  Heart sounds: Normal heart sounds  No murmur heard  Pulmonary:      Effort: Pulmonary effort is normal  No respiratory distress  Breath sounds: Normal breath sounds  No wheezing  Abdominal:      General: Bowel sounds are normal       Palpations: Abdomen is soft  Musculoskeletal:         General: Normal range of motion  Cervical back: Normal range of motion  Skin:     General: Skin is warm and dry     Neurological:      General: No focal deficit present  Mental Status: He is alert and oriented to person, place, and time  Psychiatric:         Mood and Affect: Mood normal          Behavior: Behavior normal          Thought Content:  Thought content normal          Judgment: Judgment normal

## 2022-08-02 ENCOUNTER — TELEPHONE (OUTPATIENT)
Dept: GASTROENTEROLOGY | Facility: CLINIC | Age: 52
End: 2022-08-02

## 2022-08-02 ENCOUNTER — PREP FOR PROCEDURE (OUTPATIENT)
Dept: GASTROENTEROLOGY | Facility: CLINIC | Age: 52
End: 2022-08-02

## 2022-08-02 DIAGNOSIS — Z12.11 SCREENING FOR COLON CANCER: Primary | ICD-10-CM

## 2022-08-02 NOTE — TELEPHONE ENCOUNTER
Patients GI provider:  Passed OA    Number to return call: ([phone])    Reason for call: Pt calling to schedule his colonoscopy screening  Please call to schedule  Thank you!     Scheduled procedure/appointment date if applicable: Apt/procedure

## 2022-08-02 NOTE — TELEPHONE ENCOUNTER
Spoke with the patient and he scheduled his colonoscopy  Prep instructions mailed to his home  Scheduled date of colonoscopy (as of today):10/14/22  Physician performing colonoscopy: Derrell  Location of colonoscopy:[facility]  Bowel prep reviewed with patient:miralax/mag citrate  Instructions reviewed with patient by:Brooklyn JURADO  Clearances: none      08/02/22  Screened by: Tristen Minaya    Referring Provider     Pre- Screening: There is no height or weight on file to calculate BMI  Has patient been referred for a routine screening Colonoscopy? yes  Is the patient between 39-70 years old? yes      Previous Colonoscopy no   If yes:    Date:     Facility:     Reason:       SCHEDULING STAFF: If the patient is between 45yrs-49yrs, please advise patient to confirm benefits/coverage with their insurance company for a routine screening colonoscopy, some insurance carriers will only cover at [facility] or [facility]  If the patient is over 66years old, please schedule an office visit  Does the patient want to see a Gastroenterologist prior to their procedure OR are they having any GI symptoms? no    Has the patient been hospitalized or had abdominal surgery in the past 6 months? no    Does the patient use supplemental oxygen? no    Does the patient take Coumadin, Lovenox, Plavix, Elliquis, Xarelto, or other blood thinning medication? no    Has the patient had a stroke, cardiac event, or stent placed in the past year? no    SCHEDULING STAFF: If patient answers NO to above questions, then schedule procedure  If patient answers YES to above questions, then schedule office appointment  If patient is between 45yrs - 49yrs, please advise patient that we will have to confirm benefits & coverage with their insurance company for a routine screening colonoscopy

## 2022-08-22 DIAGNOSIS — E78.00 HIGH CHOLESTEROL: ICD-10-CM

## 2022-08-22 RX ORDER — ATORVASTATIN CALCIUM 10 MG/1
10 TABLET, FILM COATED ORAL DAILY
Qty: 90 TABLET | Refills: 2 | Status: SHIPPED | OUTPATIENT
Start: 2022-08-22

## 2022-10-11 ENCOUNTER — NURSE TRIAGE (OUTPATIENT)
Dept: OTHER | Facility: OTHER | Age: 52
End: 2022-10-11

## 2022-10-11 NOTE — TELEPHONE ENCOUNTER
Reason for Disposition  • Bowel prep for colonoscopy, questions about    Answer Assessment - Initial Assessment Questions  1  DATE/TIME: "When did you have your colonoscopy?"       Scheduled for 10/14/22    2   MAIN CONCERN: "What is your main concern right now?" "What questions do you have?"      "The mag citrate is recalled, what is the alternative?"    Protocols used: COLONOSCOPY SYMPTOMS AND QUESTIONS-ADULT-

## 2022-10-11 NOTE — TELEPHONE ENCOUNTER
Regarding: bowel prep issues   ----- Message from Ruth Antonio sent at 10/11/2022  4:10 PM EDT -----  "I am calling for an alternative for magnesium citrate for my prep on Friday"

## 2022-10-14 ENCOUNTER — ANESTHESIA (OUTPATIENT)
Dept: GASTROENTEROLOGY | Facility: HOSPITAL | Age: 52
End: 2022-10-14

## 2022-10-14 ENCOUNTER — HOSPITAL ENCOUNTER (OUTPATIENT)
Dept: GASTROENTEROLOGY | Facility: HOSPITAL | Age: 52
Setting detail: OUTPATIENT SURGERY
Discharge: HOME | End: 2022-10-14
Attending: INTERNAL MEDICINE
Payer: COMMERCIAL

## 2022-10-14 ENCOUNTER — ANESTHESIA EVENT (OUTPATIENT)
Dept: GASTROENTEROLOGY | Facility: HOSPITAL | Age: 52
End: 2022-10-14

## 2022-10-14 VITALS
TEMPERATURE: 98.5 F | HEART RATE: 66 BPM | OXYGEN SATURATION: 97 % | SYSTOLIC BLOOD PRESSURE: 101 MMHG | BODY MASS INDEX: 29.96 KG/M2 | HEIGHT: 63 IN | RESPIRATION RATE: 14 BRPM | DIASTOLIC BLOOD PRESSURE: 66 MMHG | WEIGHT: 169.09 LBS

## 2022-10-14 DIAGNOSIS — Z12.11 SCREENING FOR COLON CANCER: ICD-10-CM

## 2022-10-14 PROCEDURE — 88305 TISSUE EXAM BY PATHOLOGIST: CPT | Performed by: STUDENT IN AN ORGANIZED HEALTH CARE EDUCATION/TRAINING PROGRAM

## 2022-10-14 RX ORDER — SODIUM CHLORIDE, SODIUM LACTATE, POTASSIUM CHLORIDE, CALCIUM CHLORIDE 600; 310; 30; 20 MG/100ML; MG/100ML; MG/100ML; MG/100ML
INJECTION, SOLUTION INTRAVENOUS CONTINUOUS PRN
Status: DISCONTINUED | OUTPATIENT
Start: 2022-10-14 | End: 2022-10-14

## 2022-10-14 RX ORDER — PROPOFOL 10 MG/ML
INJECTION, EMULSION INTRAVENOUS AS NEEDED
Status: DISCONTINUED | OUTPATIENT
Start: 2022-10-14 | End: 2022-10-14

## 2022-10-14 RX ADMIN — SODIUM CHLORIDE, SODIUM LACTATE, POTASSIUM CHLORIDE, AND CALCIUM CHLORIDE: .6; .31; .03; .02 INJECTION, SOLUTION INTRAVENOUS at 08:40

## 2022-10-14 RX ADMIN — PROPOFOL 150 MG: 10 INJECTION, EMULSION INTRAVENOUS at 08:55

## 2022-10-14 RX ADMIN — PROPOFOL 50 MG: 10 INJECTION, EMULSION INTRAVENOUS at 08:59

## 2022-10-14 RX ADMIN — PROPOFOL 30 MG: 10 INJECTION, EMULSION INTRAVENOUS at 09:01

## 2022-10-14 NOTE — H&P
History and Physical - [facility]  Mark Mayo 50 y o  male MRN: [id number]                  HPI: Mark Mayo is a 50y o  year old male who presents for colonoscopy for colon cancer screening      REVIEW OF SYSTEMS: Per the HPI, and otherwise unremarkable  Historical Information   Past Medical History:   Diagnosis Date   • Allergic    • Anxiety    • Hyperlipidemia    • Seasonal allergies    • Shoulder injury, right, sequela      History reviewed  No pertinent surgical history  Social History   Social History     Substance and Sexual Activity   Alcohol Use Yes   • Alcohol/week: 2 0 standard drinks   • Types: 2 Standard drinks or equivalent per week    Comment: moderate     Social History     Substance and Sexual Activity   Drug Use Never     Social History     Tobacco Use   Smoking Status Never Smoker   Smokeless Tobacco Never Used   Tobacco Comment    Former Smoke as per [employer]  Family History   Problem Relation Age of Onset   • Breast cancer Mother    • Hypertension Mother    • No Known Problems Father        Meds/Allergies     (Not in a hospital admission)      No Known Allergies    Objective     Blood pressure 122/82, pulse 68, temperature 97 9 °F (36 6 °C), temperature source Temporal, resp  rate 16, height 5' 3" (1 6 m), weight 76 7 kg (169 lb 1 5 oz), SpO2 97 %  PHYSICAL EXAM    /82   Pulse 68   Temp 97 9 °F (36 6 °C) (Temporal)   Resp 16   Ht 5' 3" (1 6 m)   Wt 76 7 kg (169 lb 1 5 oz)   SpO2 97%   BMI 29 95 kg/m²       Gen: NAD  CV: RRR  CHEST: Clear  ABD: soft, NT/ND  EXT: no edema      ASSESSMENT/PLAN:  This is a 50y o  year old male here for colonoscopy, and he is stable and optimized for his procedure

## 2022-10-14 NOTE — ANESTHESIA PREPROCEDURE EVALUATION
Procedure:  COLONOSCOPY    Relevant Problems   CARDIO   (+) High cholesterol     BMI 30     Physical Exam    Airway    Mallampati score: II  TM Distance: >3 FB  Neck ROM: full     Dental   No notable dental hx     Cardiovascular      Pulmonary      Other Findings        Anesthesia Plan  ASA Score- 2     Anesthesia Type- IV sedation with anesthesia with ASA Monitors  Additional Monitors:   Airway Plan:           Plan Factors-    Chart reviewed  Patient summary reviewed  Induction- intravenous  Postoperative Plan-     Informed Consent- Anesthetic plan and risks discussed with patient  I personally reviewed this patient with the CRNA  Discussed and agreed on the Anesthesia Plan with the AJAY Yates

## 2022-10-14 NOTE — ANESTHESIA POSTPROCEDURE EVALUATION
Post-Op Assessment Note    CV Status:  Stable    Pain management: adequate     Mental Status:  Sleepy   Hydration Status:  Euvolemic   PONV Controlled:  Controlled   Airway Patency:  Patent      Post Op Vitals Reviewed: Yes      Staff: CRNA         No complications documented      BP   109/73   Temp      Pulse  90   Resp   20   SpO2  98

## 2022-10-16 PROBLEM — Z12.11 ENCOUNTER FOR SCREENING COLONOSCOPY: Status: RESOLVED | Noted: 2022-04-14 | Resolved: 2022-10-16

## 2022-10-20 ENCOUNTER — OFFICE VISIT (OUTPATIENT)
Dept: FAMILY MEDICINE CLINIC | Facility: CLINIC | Age: 52
End: 2022-10-20
Payer: COMMERCIAL

## 2022-10-20 VITALS
HEART RATE: 66 BPM | TEMPERATURE: 97.9 F | WEIGHT: 174 LBS | SYSTOLIC BLOOD PRESSURE: 110 MMHG | OXYGEN SATURATION: 97 % | DIASTOLIC BLOOD PRESSURE: 72 MMHG | HEIGHT: 63 IN | BODY MASS INDEX: 30.83 KG/M2

## 2022-10-20 DIAGNOSIS — F51.01 PRIMARY INSOMNIA: ICD-10-CM

## 2022-10-20 DIAGNOSIS — E78.00 HIGH CHOLESTEROL: Primary | ICD-10-CM

## 2022-10-20 DIAGNOSIS — Z12.5 SCREENING FOR PROSTATE CANCER: ICD-10-CM

## 2022-10-20 DIAGNOSIS — E66.09 CLASS 1 OBESITY DUE TO EXCESS CALORIES WITHOUT SERIOUS COMORBIDITY WITH BODY MASS INDEX (BMI) OF 31.0 TO 31.9 IN ADULT: ICD-10-CM

## 2022-10-20 PROCEDURE — 99213 OFFICE O/P EST LOW 20 MIN: CPT | Performed by: NURSE PRACTITIONER

## 2022-10-20 NOTE — PROGRESS NOTES
Assessment/Plan:           Problem List Items Addressed This Visit        Other    Class 1 obesity due to excess calories without serious comorbidity with body mass index (BMI) of 31 0 to 31 9 in adult     Patient has also lost some weight          Primary insomnia     Trazodone 100 mg at night          High cholesterol - Primary     Taking the atorvastatin 10 mg and lipid panel ordered          Relevant Orders    Lipid Panel with Direct LDL reflex    Comprehensive metabolic panel    Screening for prostate cancer    Relevant Orders    PSA, Total Screen              Subjective:      Patient ID: Joaquin Moctezuma is a 50 y o  male  Patient here today for his check up  Patient had his colonoscopy Dr Faisal Galdamez and had a polyp removed and return in 5 years  Patient has no acute complaints  The following portions of the patient's history were reviewed and updated as appropriate:   He  has a past medical history of Allergic, Anxiety, Hyperlipidemia, Seasonal allergies, and Shoulder injury, right, sequela  He   Patient Active Problem List    Diagnosis Date Noted   • Screening for prostate cancer 10/20/2022   • High cholesterol 01/31/2020   • Class 1 obesity due to excess calories without serious comorbidity with body mass index (BMI) of 31 0 to 31 9 in adult 01/03/2020   • Primary insomnia 01/03/2020     He  has no past surgical history on file  His family history includes Breast cancer in his mother; Hypertension in his mother; No Known Problems in his father  He  reports that he has never smoked  He has never used smokeless tobacco  He reports current alcohol use of about 2 0 standard drinks of alcohol per week  He reports that he does not use drugs    Current Outpatient Medications   Medication Sig Dispense Refill   • atorvastatin (LIPITOR) 10 mg tablet Take 1 tablet (10 mg total) by mouth daily 90 tablet 2   • fluticasone (FLONASE) 50 mcg/act nasal spray 1 spray into each nostril daily 54 mL 1   • glucosamine-chondroitin 500-400 MG tablet Take 1 tablet by mouth 3 (three) times a day     • ibuprofen (MOTRIN) 800 mg tablet Take 1 tablet (800 mg total) by mouth every 8 (eight) hours as needed for mild pain 30 tablet 0   • Misc Natural Products (TART CHERRY ADVANCED PO) Take by mouth     • NON FORMULARY CBD [place] to [place] prn     • traZODone (DESYREL) 100 mg tablet Take 1 tablet (100 mg total) by mouth daily at bedtime 90 tablet 3   • Turmeric 500 MG TABS Take by mouth       No current facility-administered medications for this visit  He has No Known Allergies       Review of Systems   Constitutional: Negative for activity change, appetite change, chills, diaphoresis, fatigue, fever and unexpected weight change  HENT: Negative for congestion, ear pain, hearing loss, postnasal drip, sinus pressure, sinus pain, sneezing and sore throat  Eyes: Negative for pain, redness and visual disturbance  Respiratory: Negative for cough and shortness of breath  Cardiovascular: Negative for chest pain and leg swelling  Gastrointestinal: Negative for abdominal pain, diarrhea, nausea and vomiting  Endocrine: Negative  Genitourinary: Negative  Musculoskeletal: Negative for arthralgias  Allergic/Immunologic: Negative  Neurological: Negative for dizziness and light-headedness  Psychiatric/Behavioral: Negative for behavioral problems, dysphoric mood and sleep disturbance  The patient is not nervous/anxious  Objective:      /72 (BP Location: Left arm, Patient Position: Sitting)   Pulse 66   Temp 97 9 °F (36 6 °C) (Temporal)   Ht 5' 3" (1 6 m)   Wt 78 9 kg (174 lb)   SpO2 97%   BMI 30 82 kg/m²          Physical Exam  Vitals reviewed  Constitutional:       General: He is not in acute distress  Appearance: He is well-developed  HENT:      Head: Normocephalic and atraumatic        Right Ear: Tympanic membrane normal       Left Ear: Tympanic membrane normal       Nose: Nose normal  Mouth/Throat:      Mouth: Mucous membranes are moist    Eyes:      Pupils: Pupils are equal, round, and reactive to light  Neck:      Thyroid: No thyromegaly  Cardiovascular:      Rate and Rhythm: Normal rate and regular rhythm  Heart sounds: Normal heart sounds  No murmur heard  Pulmonary:      Effort: Pulmonary effort is normal  No respiratory distress  Breath sounds: Normal breath sounds  No wheezing  Abdominal:      General: Bowel sounds are normal       Palpations: Abdomen is soft  Musculoskeletal:         General: Normal range of motion  Cervical back: Normal range of motion  Skin:     General: Skin is warm and dry  Neurological:      General: No focal deficit present  Mental Status: He is alert and oriented to person, place, and time     Psychiatric:         Mood and Affect: Mood normal

## 2022-10-26 PROCEDURE — 88305 TISSUE EXAM BY PATHOLOGIST: CPT | Performed by: STUDENT IN AN ORGANIZED HEALTH CARE EDUCATION/TRAINING PROGRAM

## 2022-12-19 PROBLEM — Z12.5 SCREENING FOR PROSTATE CANCER: Status: RESOLVED | Noted: 2022-10-20 | Resolved: 2022-12-19

## 2022-12-28 ENCOUNTER — OFFICE VISIT (OUTPATIENT)
Dept: URGENT CARE | Facility: CLINIC | Age: 52
End: 2022-12-28

## 2022-12-28 VITALS
RESPIRATION RATE: 16 BRPM | OXYGEN SATURATION: 99 % | SYSTOLIC BLOOD PRESSURE: 110 MMHG | TEMPERATURE: 97.3 F | BODY MASS INDEX: 31.92 KG/M2 | DIASTOLIC BLOOD PRESSURE: 82 MMHG | HEART RATE: 72 BPM | WEIGHT: 180.19 LBS

## 2022-12-28 DIAGNOSIS — R05.1 ACUTE COUGH: ICD-10-CM

## 2022-12-28 DIAGNOSIS — B34.9 VIRAL INFECTION: Primary | ICD-10-CM

## 2022-12-28 NOTE — PROGRESS NOTES
[facility]        NAME: Moris Rangel is a 50 y o  male  : 1970    MRN: [id number]  DATE: 2022  TIME: 8:38 AM    Assessment and Plan   Viral infection [B34 9]  1  Viral infection        2  Acute cough  Covid/Flu-Office Collect          Suspected viral illness  Swabbed for COVID/FLu  Given education on supportive measures for symptoms in discharge instructions  Follow up with primary care in 3-5 days  Go to ER if symptoms get worse  Patient Instructions     --Rest, drink plenty of fluids     --For nasal/sinus congestion, you can try steam, warm compresses, saline nasal spray or Neti pot  Other medication options include: nasal steroid (Flonase, Nasocort) to be used at bedtime after the saline nasal spray or nasal decongestant (Afrin, Ronni-synephrine - for 3 days max or you can get rebound symptoms) may be taken  Can also decongestant (such as Sudafed) if > 10years of age and no history of high blood pressure  --For nasal drainage, postnasal drip, sneezing and itching, an OTC antihistamine (Allegra, Benadryl, etc) can be taken  --For cough, you can take an OTC expectorant such as plain Robitussion or Mucinex (active ingredient guaifenesin)  A spoonful of honey at bedtime may also be helpful  Also recommended is the use of a cool mist humidifier (with or without Vicks) in the bedroom at night  --For sore throat, you can take OTC lozenges, use warm gargles (salt water or apple cider vinegar and honey), herbal teas, or an OTC throat spray (Chloraseptic)  --You can take Tylenol or Motrin/Advil as needed for fever, headache, body aches  Motrin/Advil should be avoided, however, if you have a history of heart disease, bleeding ulcers, or if you take blood thinners      -For cold symptoms with high blood pressure you can try Coricidin cough/cold       --You should contact your primary care provider and/or go to the ER if your symptoms are not improved or get worse over the next 7 days  This includes new onset fever, localized ear pain, sinus pain, as well as worsening cough, chest pain, shortness of breath, or significant weakness/fatigue  Chief Complaint     Chief Complaint   Patient presents with   • URI     3 days head pressure, sinus pressure, ear pressure, runny/stuffy nose, post nasal drip  No fever  Taking tylenol and afrin  History of Present Illness       Presents with 3 days of head pressure, sinus pressure, ear pressure, runny/stuffy nose, post nasal drip  No fever  Taking tylenol and afrin for symptoms  Denies known sick contacts  Review of Systems   Review of Systems   Constitutional: Negative for chills and fever  HENT: Positive for congestion, sinus pressure and sore throat  Negative for ear pain  Respiratory: Positive for cough  Negative for shortness of breath  Cardiovascular: Negative for chest pain and palpitations  Gastrointestinal: Negative for diarrhea, nausea and vomiting  Musculoskeletal: Negative for myalgias  Skin: Negative for color change and rash  Psychiatric/Behavioral: Negative for confusion  All other systems reviewed and are negative          Current Medications       Current Outpatient Medications:   •  atorvastatin (LIPITOR) 10 mg tablet, Take 1 tablet (10 mg total) by mouth daily, Disp: 90 tablet, Rfl: 2  •  fluticasone (FLONASE) 50 mcg/act nasal spray, 1 spray into each nostril daily, Disp: 54 mL, Rfl: 1  •  glucosamine-chondroitin 500-400 MG tablet, Take 1 tablet by mouth 3 (three) times a day, Disp: , Rfl:   •  ibuprofen (MOTRIN) 800 mg tablet, Take 1 tablet (800 mg total) by mouth every 8 (eight) hours as needed for mild pain, Disp: 30 tablet, Rfl: 0  •  Misc Natural Products (TART CHERRY ADVANCED PO), Take by mouth, Disp: , Rfl:   •  NON FORMULARY, CBD [place] to [place] prn, Disp: , Rfl:   •  traZODone (DESYREL) 100 mg tablet, Take 1 tablet (100 mg total) by mouth daily at bedtime, Disp: 90 tablet, Rfl: 3  • Turmeric 500 MG TABS, Take by mouth, Disp: , Rfl:     Current Allergies     Allergies as of 12/28/2022   • (No Known Allergies)            The following portions of the patient's history were reviewed and updated as appropriate: allergies, current medications, past family history, past medical history, past social history, past surgical history and problem list      Past Medical History:   Diagnosis Date   • Allergic    • Anxiety    • Hyperlipidemia    • Seasonal allergies    • Shoulder injury, right, sequela        History reviewed  No pertinent surgical history  Family History   Problem Relation Age of Onset   • Breast cancer Mother    • Hypertension Mother    • No Known Problems Father          Medications have been verified  Objective   /82   Pulse 72   Temp (!) 97 3 °F (36 3 °C)   Resp 16   Wt 81 7 kg (180 lb 3 oz)   SpO2 99%   BMI 31 92 kg/m²        Physical Exam     Physical Exam  Vitals reviewed  Constitutional:       General: He is not in acute distress  Appearance: Normal appearance  HENT:      Right Ear: Tympanic membrane, ear canal and external ear normal       Left Ear: Tympanic membrane, ear canal and external ear normal       Nose: Nose normal       Right Sinus: No maxillary sinus tenderness or frontal sinus tenderness  Left Sinus: No maxillary sinus tenderness or frontal sinus tenderness  Mouth/Throat:      Mouth: Mucous membranes are moist       Pharynx: No posterior oropharyngeal erythema  Eyes:      Conjunctiva/sclera: Conjunctivae normal    Cardiovascular:      Rate and Rhythm: Normal rate and regular rhythm  Pulses: Normal pulses  Heart sounds: Normal heart sounds  No murmur heard  Pulmonary:      Effort: Pulmonary effort is normal  No respiratory distress  Breath sounds: Normal breath sounds  Skin:     General: Skin is warm and dry  Neurological:      General: No focal deficit present        Mental Status: He is alert and oriented to person, place, and time     Psychiatric:         Mood and Affect: Mood normal          Behavior: Behavior normal

## 2022-12-29 LAB
FLUAV RNA RESP QL NAA+PROBE: NEGATIVE
FLUBV RNA RESP QL NAA+PROBE: NEGATIVE
SARS-COV-2 RNA RESP QL NAA+PROBE: NEGATIVE

## 2023-05-04 DIAGNOSIS — E78.00 HIGH CHOLESTEROL: ICD-10-CM

## 2023-05-04 RX ORDER — ATORVASTATIN CALCIUM 10 MG/1
TABLET, FILM COATED ORAL
Qty: 90 TABLET | Refills: 3 | Status: SHIPPED | OUTPATIENT
Start: 2023-05-04

## 2023-05-11 DIAGNOSIS — F51.01 PRIMARY INSOMNIA: ICD-10-CM

## 2023-05-11 DIAGNOSIS — F41.9 ANXIETY: ICD-10-CM

## 2023-05-11 RX ORDER — TRAZODONE HYDROCHLORIDE 100 MG/1
TABLET ORAL
Qty: 90 TABLET | Refills: 3 | Status: SHIPPED | OUTPATIENT
Start: 2023-05-11

## 2023-06-05 DIAGNOSIS — J30.9 ALLERGIC RHINITIS, UNSPECIFIED SEASONALITY, UNSPECIFIED TRIGGER: ICD-10-CM

## 2023-06-05 RX ORDER — FLUTICASONE PROPIONATE 50 MCG
SPRAY, SUSPENSION (ML) NASAL
Qty: 48 G | Refills: 5 | Status: SHIPPED | OUTPATIENT
Start: 2023-06-05

## 2024-01-22 DIAGNOSIS — Z12.5 SCREENING PSA (PROSTATE SPECIFIC ANTIGEN): ICD-10-CM

## 2024-01-22 DIAGNOSIS — E78.00 HIGH CHOLESTEROL: Primary | ICD-10-CM

## 2024-01-26 ENCOUNTER — APPOINTMENT (OUTPATIENT)
Dept: LAB | Facility: CLINIC | Age: 54
End: 2024-01-26
Payer: COMMERCIAL

## 2024-01-26 DIAGNOSIS — E78.00 HIGH CHOLESTEROL: ICD-10-CM

## 2024-01-26 DIAGNOSIS — Z12.5 SCREENING PSA (PROSTATE SPECIFIC ANTIGEN): ICD-10-CM

## 2024-01-26 LAB
ALBUMIN SERPL BCP-MCNC: 4.7 G/DL (ref 3.5–5)
ALP SERPL-CCNC: 55 U/L (ref 34–104)
ALT SERPL W P-5'-P-CCNC: 19 U/L (ref 7–52)
ANION GAP SERPL CALCULATED.3IONS-SCNC: 8 MMOL/L
AST SERPL W P-5'-P-CCNC: 15 U/L (ref 13–39)
BILIRUB SERPL-MCNC: 0.63 MG/DL (ref 0.2–1)
BUN SERPL-MCNC: 17 MG/DL (ref 5–25)
CALCIUM SERPL-MCNC: 10.2 MG/DL (ref 8.4–10.2)
CHLORIDE SERPL-SCNC: 100 MMOL/L (ref 96–108)
CHOLEST SERPL-MCNC: 196 MG/DL
CO2 SERPL-SCNC: 31 MMOL/L (ref 21–32)
CREAT SERPL-MCNC: 0.75 MG/DL (ref 0.6–1.3)
GFR SERPL CREATININE-BSD FRML MDRD: 106 ML/MIN/1.73SQ M
GLUCOSE P FAST SERPL-MCNC: 73 MG/DL (ref 65–99)
HDLC SERPL-MCNC: 42 MG/DL
LDLC SERPL CALC-MCNC: 118 MG/DL (ref 0–100)
POTASSIUM SERPL-SCNC: 4 MMOL/L (ref 3.5–5.3)
PROT SERPL-MCNC: 7.2 G/DL (ref 6.4–8.4)
PSA SERPL-MCNC: 0.69 NG/ML (ref 0–4)
SODIUM SERPL-SCNC: 139 MMOL/L (ref 135–147)
TRIGL SERPL-MCNC: 180 MG/DL

## 2024-01-26 PROCEDURE — 80053 COMPREHEN METABOLIC PANEL: CPT

## 2024-01-26 PROCEDURE — G0103 PSA SCREENING: HCPCS

## 2024-01-26 PROCEDURE — 36415 COLL VENOUS BLD VENIPUNCTURE: CPT

## 2024-01-26 PROCEDURE — 80061 LIPID PANEL: CPT

## 2024-02-12 ENCOUNTER — OFFICE VISIT (OUTPATIENT)
Dept: FAMILY MEDICINE CLINIC | Facility: CLINIC | Age: 54
End: 2024-02-12
Payer: COMMERCIAL

## 2024-02-12 VITALS
TEMPERATURE: 97.8 F | HEIGHT: 63 IN | BODY MASS INDEX: 31.01 KG/M2 | SYSTOLIC BLOOD PRESSURE: 108 MMHG | OXYGEN SATURATION: 98 % | DIASTOLIC BLOOD PRESSURE: 80 MMHG | HEART RATE: 68 BPM | WEIGHT: 175 LBS

## 2024-02-12 DIAGNOSIS — Z00.00 ANNUAL PHYSICAL EXAM: Primary | ICD-10-CM

## 2024-02-12 DIAGNOSIS — E78.00 HIGH CHOLESTEROL: ICD-10-CM

## 2024-02-12 DIAGNOSIS — Z23 NEED FOR SHINGLES VACCINE: ICD-10-CM

## 2024-02-12 DIAGNOSIS — Z23 ENCOUNTER FOR IMMUNIZATION: ICD-10-CM

## 2024-02-12 DIAGNOSIS — Z12.5 SCREENING PSA (PROSTATE SPECIFIC ANTIGEN): ICD-10-CM

## 2024-02-12 DIAGNOSIS — Z23 NEEDS FLU SHOT: ICD-10-CM

## 2024-02-12 PROCEDURE — 90471 IMMUNIZATION ADMIN: CPT

## 2024-02-12 PROCEDURE — 90686 IIV4 VACC NO PRSV 0.5 ML IM: CPT

## 2024-02-12 PROCEDURE — 99396 PREV VISIT EST AGE 40-64: CPT | Performed by: FAMILY MEDICINE

## 2024-02-12 PROCEDURE — 90750 HZV VACC RECOMBINANT IM: CPT

## 2024-02-12 PROCEDURE — 90472 IMMUNIZATION ADMIN EACH ADD: CPT

## 2024-02-12 NOTE — PROGRESS NOTES
ADULT ANNUAL PHYSICAL  [facility]    NAME: Benito Catalan  AGE: 53 y.o. SEX: male  : 1970     DATE: 2024     Assessment and Plan:     Problem List Items Addressed This Visit        Other    High cholesterol    Relevant Orders    Lipid Panel with Direct LDL reflex    Comprehensive metabolic panel   Other Visit Diagnoses     Annual physical exam    -  Primary    Need for shingles vaccine        Screening PSA (prostate specific antigen)        Relevant Orders    PSA, Total Screen            Immunizations and preventive care screenings were discussed with patient today. Appropriate education was printed on patient's after visit summary.      Counseling:  Alcohol/drug use: discussed moderation in alcohol intake, the recommendations for healthy alcohol use, and avoidance of illicit drug use.  Dental Health: discussed importance of regular tooth brushing, flossing, and dental visits.  Exercise: the importance of regular exercise/physical activity was discussed. Recommend exercise 3-5 times per week for at least 30 minutes.          Return in about 1 year (around 2025) for Annual physical.  2 month nurse visit for shingrix #2 .     Chief Complaint:     Chief Complaint   Patient presents with   • Review Labs      History of Present Illness:     Adult Annual Physical   Patient here for a comprehensive physical exam. The patient reports no problems.    He had labs done which were reviewed.  Cholesterol is controlled on atorvastatin.  PSA and CMP within normal limits    Diet and Physical Activity  Diet/Nutrition: poor diet.   Exercise: no formal exercise.      Depression Screening  PHQ-2/9 Depression Screening    Little interest or pleasure in doing things: 0 - not at all  Feeling down, depressed, or hopeless: 0 - not at all  PHQ-2 Score: 0  PHQ-2 Interpretation: Negative depression screen       General Health  Sleep: sleeps well. On Trazodone    Hearing:  no issues .  Vision: goes for regular eye exams and wears glasses.   Dental: regular dental visits.            Review of Systems:     Review of Systems   Constitutional:  Negative for activity change, appetite change, fatigue and unexpected weight change.   Respiratory:  Negative for chest tightness and shortness of breath.    Cardiovascular:  Negative for chest pain and leg swelling.   Gastrointestinal:  Negative for abdominal pain.   Neurological:  Negative for headaches.      Past Medical History:     Past Medical History:   Diagnosis Date   • Allergic    • Anxiety    • Hyperlipidemia    • Seasonal allergies    • Shoulder injury, right, sequela       Past Surgical History:     No past surgical history on file.   Family History:     Family History   Problem Relation Age of Onset   • Breast cancer Mother    • Hypertension Mother    • No Known Problems Father       Social History:     Social History     Socioeconomic History   • Marital status: /Civil Union     Spouse name: None   • Number of children: None   • Years of education: None   • Highest education level: None   Occupational History   • None   Tobacco Use   • Smoking status: Never   • Smokeless tobacco: Never   • Tobacco comments:     Former Smoke as per [employer].    Vaping Use   • Vaping status: Never Used   Substance and Sexual Activity   • Alcohol use: Yes     Alcohol/week: 2.0 standard drinks of alcohol     Types: 2 Standard drinks or equivalent per week     Comment: moderate   • Drug use: Never   • Sexual activity: None   Other Topics Concern   • None   Social History Narrative   • None     Social Determinants of Health     Financial Resource Strain: Not on file   Food Insecurity: Not on file   Transportation Needs: Not on file   Physical Activity: Not on file   Stress: Not on file   Social Connections: Not on file   Intimate Partner Violence: Not on file   Housing Stability: Not on file      Current Medications:     Current  "Outpatient Medications   Medication Sig Dispense Refill   • atorvastatin (LIPITOR) 10 mg tablet TAKE 1 TABLET DAILY 90 tablet 3   • fluticasone (FLONASE) 50 mcg/act nasal spray USE 1 SPRAY IN EACH NOSTRIL DAILY 48 g 5   • glucosamine-chondroitin 500-400 MG tablet Take 1 tablet by mouth 3 (three) times a day     • ibuprofen (MOTRIN) 800 mg tablet Take 1 tablet (800 mg total) by mouth every 8 (eight) hours as needed for mild pain 30 tablet 0   • Misc Natural Products (TART CHERRY ADVANCED PO) Take by mouth     • traZODone (DESYREL) 100 mg tablet TAKE 1 TABLET DAILY AT BEDTIME 90 tablet 3   • Turmeric 500 MG TABS Take by mouth       No current facility-administered medications for this visit.      Allergies:     No Known Allergies   Physical Exam:     /80   Pulse 68   Temp 97.8 °F (36.6 °C)   Ht 5' 3\" (1.6 m)   Wt 79.4 kg (175 lb)   SpO2 98%   BMI 31.00 kg/m²     Physical Exam  Vitals and nursing note reviewed.   Constitutional:       General: He is not in acute distress.     Appearance: He is well-developed. He is not diaphoretic.   HENT:      Head: Normocephalic and atraumatic.      Right Ear: External ear normal. There is impacted cerumen.      Left Ear: Tympanic membrane, ear canal and external ear normal.      Nose: Nose normal.      Mouth/Throat:      Mouth: Mucous membranes are moist.      Pharynx: Oropharynx is clear. No oropharyngeal exudate.   Eyes:      General: No scleral icterus.        Right eye: No discharge.         Left eye: No discharge.      Conjunctiva/sclera: Conjunctivae normal.      Pupils: Pupils are equal, round, and reactive to light.   Neck:      Thyroid: No thyromegaly.   Cardiovascular:      Rate and Rhythm: Normal rate and regular rhythm.      Heart sounds: Normal heart sounds. No murmur heard.     No friction rub. No gallop.   Pulmonary:      Effort: Pulmonary effort is normal. No respiratory distress.      Breath sounds: Normal breath sounds. No stridor. No wheezing or rales. "   Chest:      Chest wall: No tenderness.   Abdominal:      General: Bowel sounds are normal. There is no distension.      Palpations: Abdomen is soft. There is no mass.      Tenderness: There is no abdominal tenderness. There is no guarding or rebound.      Hernia: No hernia is present.   Musculoskeletal:         General: No swelling.      Right lower leg: No edema.      Left lower leg: No edema.   Skin:     General: Skin is warm and dry.      Findings: No rash.   Neurological:      General: No focal deficit present.      Mental Status: He is alert and oriented to person, place, and time.      Cranial Nerves: No cranial nerve deficit.   Psychiatric:         Mood and Affect: Mood normal.         Behavior: Behavior normal.         Thought Content: Thought content normal.         Judgment: Judgment normal.          Bailey Maddox MD  [facility]

## 2024-04-23 ENCOUNTER — CLINICAL SUPPORT (OUTPATIENT)
Dept: FAMILY MEDICINE CLINIC | Facility: CLINIC | Age: 54
End: 2024-04-23
Payer: COMMERCIAL

## 2024-04-23 DIAGNOSIS — Z23 ENCOUNTER FOR IMMUNIZATION: Primary | ICD-10-CM

## 2024-04-23 PROCEDURE — 90471 IMMUNIZATION ADMIN: CPT | Performed by: FAMILY MEDICINE

## 2024-04-23 PROCEDURE — 90750 HZV VACC RECOMBINANT IM: CPT | Performed by: FAMILY MEDICINE

## 2024-04-29 DIAGNOSIS — E78.00 HIGH CHOLESTEROL: ICD-10-CM

## 2024-04-29 RX ORDER — ATORVASTATIN CALCIUM 10 MG/1
TABLET, FILM COATED ORAL
Qty: 90 TABLET | Refills: 3 | Status: SHIPPED | OUTPATIENT
Start: 2024-04-29

## 2024-05-06 ENCOUNTER — TELEPHONE (OUTPATIENT)
Age: 54
End: 2024-05-06

## 2024-05-06 ENCOUNTER — OFFICE VISIT (OUTPATIENT)
Dept: FAMILY MEDICINE CLINIC | Facility: CLINIC | Age: 54
End: 2024-05-06
Payer: COMMERCIAL

## 2024-05-06 VITALS — WEIGHT: 177 LBS | TEMPERATURE: 97.5 F | HEIGHT: 63 IN | BODY MASS INDEX: 31.36 KG/M2

## 2024-05-06 DIAGNOSIS — L23.7 POISON IVY DERMATITIS: Primary | ICD-10-CM

## 2024-05-06 DIAGNOSIS — L03.113 CELLULITIS OF RIGHT FOREARM: ICD-10-CM

## 2024-05-06 DIAGNOSIS — F41.9 ANXIETY: ICD-10-CM

## 2024-05-06 DIAGNOSIS — F51.01 PRIMARY INSOMNIA: ICD-10-CM

## 2024-05-06 PROCEDURE — 99214 OFFICE O/P EST MOD 30 MIN: CPT | Performed by: PHYSICIAN ASSISTANT

## 2024-05-06 RX ORDER — TRAZODONE HYDROCHLORIDE 100 MG/1
TABLET ORAL
Qty: 90 TABLET | Refills: 1 | Status: SHIPPED | OUTPATIENT
Start: 2024-05-06

## 2024-05-06 RX ORDER — PREDNISONE 10 MG/1
TABLET ORAL DAILY
Qty: 30 TABLET | Refills: 0 | Status: SHIPPED | OUTPATIENT
Start: 2024-05-06 | End: 2024-05-18

## 2024-05-06 RX ORDER — CEPHALEXIN 500 MG/1
500 CAPSULE ORAL EVERY 6 HOURS SCHEDULED
Qty: 20 CAPSULE | Refills: 0 | Status: SHIPPED | OUTPATIENT
Start: 2024-05-06 | End: 2024-05-11

## 2024-05-06 NOTE — PROGRESS NOTES
Name: Benito Catalan      : 1970      MRN: [id number]  Encounter Provider: Monster Maldonado PA-C  Encounter Date: 2024   Encounter department: [facility]    Assessment & Plan     1. Poison ivy dermatitis  -     predniSONE 10 mg tablet; Take 4 tablets (40 mg total) by mouth daily for 3 days, THEN 3 tablets (30 mg total) daily for 3 days, THEN 2 tablets (20 mg total) daily for 3 days, THEN 1 tablet (10 mg total) daily for 3 days.    2. Cellulitis of right forearm  -     cephalexin (KEFLEX) 500 mg capsule; Take 1 capsule (500 mg total) by mouth every 6 (six) hours for 5 days    Diffuse poison ivy. Taper prednisone  Cellulitis right forearm, add keflex  Return precautions provided        Subjective     Pt presents with rash. Believes its poison ivy as he was cutting trees two days ago and developed. There is also a small wound of the R forearm with some draining and surrounding redness. No fevers. Rash on arms, groin, face, torso       Review of Systems   Constitutional: Negative.    Respiratory: Negative.     Cardiovascular: Negative.    Skin:  Positive for color change, rash and wound.       Past Medical History:   Diagnosis Date   • Allergic    • Anxiety    • Hyperlipidemia    • Seasonal allergies    • Shoulder injury, right, sequela      History reviewed. No pertinent surgical history.  Family History   Problem Relation Age of Onset   • Breast cancer Mother    • Hypertension Mother    • No Known Problems Father      Social History     Socioeconomic History   • Marital status: /Civil Union     Spouse name: None   • Number of children: None   • Years of education: None   • Highest education level: None   Occupational History   • None   Tobacco Use   • Smoking status: Never   • Smokeless tobacco: Never   • Tobacco comments:     Former Smoke as per [employer].    Vaping Use   • Vaping status: Never Used   Substance and Sexual Activity   • Alcohol use: Yes     Alcohol/week: 2.0  standard drinks of alcohol     Types: 2 Standard drinks or equivalent per week     Comment: moderate   • Drug use: Never   • Sexual activity: None   Other Topics Concern   • None   Social History Narrative   • None     Social Determinants of Health     Financial Resource Strain: Not on file   Food Insecurity: Not on file   Transportation Needs: Not on file   Physical Activity: Not on file   Stress: Not on file   Social Connections: Not on file   Intimate Partner Violence: Not on file   Housing Stability: Not on file     Current Outpatient Medications on File Prior to Visit   Medication Sig   • atorvastatin (LIPITOR) 10 mg tablet TAKE 1 TABLET DAILY   • fluticasone (FLONASE) 50 mcg/act nasal spray USE 1 SPRAY IN EACH NOSTRIL DAILY   • glucosamine-chondroitin 500-400 MG tablet Take 1 tablet by mouth 3 (three) times a day   • ibuprofen (MOTRIN) 800 mg tablet Take 1 tablet (800 mg total) by mouth every 8 (eight) hours as needed for mild pain   • Misc Natural Products (TART CHERRY ADVANCED PO) Take by mouth   • traZODone (DESYREL) 100 mg tablet TAKE 1 TABLET DAILY AT BEDTIME   • Turmeric 500 MG TABS Take by mouth   • [DISCONTINUED] traZODone (DESYREL) 100 mg tablet TAKE 1 TABLET DAILY AT BEDTIME     No Known Allergies  Immunization History   Administered Date(s) Administered   • COVID-19 J&J ([email or web address]) vaccine 0.5 mL 03/13/2021   • COVID-19 MODERNA VACC 0.25 ML IM BOOSTER 11/10/2021   • COVID-19 Moderna Vac BIVALENT 12 Yr+ IM 0.5 ML 09/30/2022   • DT (pediatric) 03/24/1976, 08/11/1982, 08/22/1994   • DTP 1970, 01/10/1973, 02/09/1973   • INFLUENZA 01/13/2022, 10/04/2022   • Influenza, injectable, quadrivalent, preservative free 0.5 mL 01/13/2022, 02/12/2024   • Influenza, recombinant, quadrivalent,injectable, preservative free 12/14/2020   • MMR 09/17/1976, 03/08/1977, 04/12/1977, 08/22/1994   • OPV 03/16/1973, 05/18/1973, 02/15/1977, 03/08/1977   • Tdap 12/14/2020   • Zoster Vaccine Recombinant 02/12/2024,  "04/23/2024       Objective     Temp 97.5 °F (36.4 °C)   Ht 5' 3\" (1.6 m)   Wt 80.3 kg (177 lb)   BMI 31.35 kg/m²     Physical Exam  Vitals and nursing note reviewed.   Constitutional:       Appearance: Normal appearance.   HENT:      Head: Normocephalic and atraumatic.   Pulmonary:      Effort: Pulmonary effort is normal. No respiratory distress.   Musculoskeletal:      Cervical back: Normal range of motion.   Skin:     General: Skin is warm and dry.      Findings: Erythema and rash present.             Comments: Diffuse vesicular erythematous streaking rashes consistent with poison ivy   Neurological:      Mental Status: He is alert and oriented to person, place, and time.       Monster Maldonado PA-C    "

## 2024-05-09 DIAGNOSIS — L03.113 CELLULITIS OF RIGHT FOREARM: Primary | ICD-10-CM

## 2024-05-09 RX ORDER — CEPHALEXIN 500 MG/1
500 CAPSULE ORAL EVERY 6 HOURS SCHEDULED
Qty: 8 CAPSULE | Refills: 0 | Status: SHIPPED | OUTPATIENT
Start: 2024-05-09 | End: 2024-05-11

## 2024-05-13 DIAGNOSIS — L03.113 CELLULITIS OF RIGHT FOREARM: Primary | ICD-10-CM

## 2024-05-13 RX ORDER — SULFAMETHOXAZOLE AND TRIMETHOPRIM 800; 160 MG/1; MG/1
1 TABLET ORAL EVERY 12 HOURS SCHEDULED
Qty: 10 TABLET | Refills: 0 | Status: SHIPPED | OUTPATIENT
Start: 2024-05-13 | End: 2024-05-18

## 2024-07-29 DIAGNOSIS — J30.9 ALLERGIC RHINITIS, UNSPECIFIED SEASONALITY, UNSPECIFIED TRIGGER: ICD-10-CM

## 2024-07-29 RX ORDER — FLUTICASONE PROPIONATE 50 MCG
SPRAY, SUSPENSION (ML) NASAL
Qty: 16 G | Refills: 5 | Status: SHIPPED | OUTPATIENT
Start: 2024-07-29

## 2024-11-01 DIAGNOSIS — F51.01 PRIMARY INSOMNIA: ICD-10-CM

## 2024-11-01 DIAGNOSIS — F41.9 ANXIETY: ICD-10-CM

## 2024-11-01 RX ORDER — TRAZODONE HYDROCHLORIDE 100 MG/1
TABLET ORAL
Qty: 90 TABLET | Refills: 1 | Status: SHIPPED | OUTPATIENT
Start: 2024-11-01

## 2025-02-04 ENCOUNTER — TELEPHONE (OUTPATIENT)
Age: 55
End: 2025-02-04

## 2025-02-04 DIAGNOSIS — Z00.00 ANNUAL PHYSICAL EXAM: ICD-10-CM

## 2025-02-04 DIAGNOSIS — E78.00 HIGH CHOLESTEROL: Primary | ICD-10-CM

## 2025-02-04 DIAGNOSIS — Z12.5 SCREENING PSA (PROSTATE SPECIFIC ANTIGEN): ICD-10-CM

## 2025-02-04 DIAGNOSIS — L03.113 CELLULITIS OF RIGHT FOREARM: ICD-10-CM

## 2025-02-04 NOTE — TELEPHONE ENCOUNTER
Patient called to reschedule his physical. It is now scheduled for 03/10. He plans on getting his labs done, but not within the time frame of his current lab orders expiring. He wants to know if new orders can be places, so he can get them done before his appointment. Thank you!    [phone]

## 2025-02-08 ENCOUNTER — APPOINTMENT (OUTPATIENT)
Dept: LAB | Facility: CLINIC | Age: 55
End: 2025-02-08
Payer: COMMERCIAL

## 2025-02-08 DIAGNOSIS — E78.00 HIGH CHOLESTEROL: ICD-10-CM

## 2025-02-08 DIAGNOSIS — Z12.5 SCREENING PSA (PROSTATE SPECIFIC ANTIGEN): ICD-10-CM

## 2025-02-08 DIAGNOSIS — Z00.00 ANNUAL PHYSICAL EXAM: ICD-10-CM

## 2025-02-08 DIAGNOSIS — L03.113 CELLULITIS OF RIGHT FOREARM: ICD-10-CM

## 2025-02-08 LAB
ALBUMIN SERPL BCG-MCNC: 4.7 G/DL (ref 3.5–5)
ALP SERPL-CCNC: 50 U/L (ref 34–104)
ALT SERPL W P-5'-P-CCNC: 21 U/L (ref 7–52)
ANION GAP SERPL CALCULATED.3IONS-SCNC: 10 MMOL/L (ref 4–13)
AST SERPL W P-5'-P-CCNC: 15 U/L (ref 13–39)
BASOPHILS # BLD AUTO: 0.04 THOUSANDS/ΜL (ref 0–0.1)
BASOPHILS NFR BLD AUTO: 1 % (ref 0–1)
BILIRUB SERPL-MCNC: 0.43 MG/DL (ref 0.2–1)
BUN SERPL-MCNC: 15 MG/DL (ref 5–25)
CALCIUM SERPL-MCNC: 9.5 MG/DL (ref 8.4–10.2)
CHLORIDE SERPL-SCNC: 102 MMOL/L (ref 96–108)
CHOLEST SERPL-MCNC: 178 MG/DL (ref ?–200)
CO2 SERPL-SCNC: 29 MMOL/L (ref 21–32)
CREAT SERPL-MCNC: 0.75 MG/DL (ref 0.6–1.3)
EOSINOPHIL # BLD AUTO: 0.22 THOUSAND/ΜL (ref 0–0.61)
EOSINOPHIL NFR BLD AUTO: 3 % (ref 0–6)
ERYTHROCYTE [DISTWIDTH] IN BLOOD BY AUTOMATED COUNT: 12.2 % (ref 11.6–15.1)
GFR SERPL CREATININE-BSD FRML MDRD: 105 ML/MIN/1.73SQ M
GLUCOSE P FAST SERPL-MCNC: 96 MG/DL (ref 65–99)
HCT VFR BLD AUTO: 41.7 % (ref 36.5–49.3)
HDLC SERPL-MCNC: 40 MG/DL
HGB BLD-MCNC: 14.3 G/DL (ref 12–17)
IMM GRANULOCYTES # BLD AUTO: 0.01 THOUSAND/UL (ref 0–0.2)
IMM GRANULOCYTES NFR BLD AUTO: 0 % (ref 0–2)
LDLC SERPL CALC-MCNC: 90 MG/DL (ref 0–100)
LYMPHOCYTES # BLD AUTO: 2.66 THOUSANDS/ΜL (ref 0.6–4.47)
LYMPHOCYTES NFR BLD AUTO: 37 % (ref 14–44)
MCH RBC QN AUTO: 31.4 PG (ref 26.8–34.3)
MCHC RBC AUTO-ENTMCNC: 34.3 G/DL (ref 31.4–37.4)
MCV RBC AUTO: 91 FL (ref 82–98)
MONOCYTES # BLD AUTO: 0.59 THOUSAND/ΜL (ref 0.17–1.22)
MONOCYTES NFR BLD AUTO: 8 % (ref 4–12)
NEUTROPHILS # BLD AUTO: 3.61 THOUSANDS/ΜL (ref 1.85–7.62)
NEUTS SEG NFR BLD AUTO: 51 % (ref 43–75)
NRBC BLD AUTO-RTO: 0 /100 WBCS
PLATELET # BLD AUTO: 295 THOUSANDS/UL (ref 149–390)
PMV BLD AUTO: 10.1 FL (ref 8.9–12.7)
POTASSIUM SERPL-SCNC: 4.8 MMOL/L (ref 3.5–5.3)
PROT SERPL-MCNC: 7.2 G/DL (ref 6.4–8.4)
PSA SERPL-MCNC: 1.25 NG/ML (ref 0–4)
RBC # BLD AUTO: 4.56 MILLION/UL (ref 3.88–5.62)
SODIUM SERPL-SCNC: 141 MMOL/L (ref 135–147)
TRIGL SERPL-MCNC: 239 MG/DL (ref ?–150)
WBC # BLD AUTO: 7.13 THOUSAND/UL (ref 4.31–10.16)

## 2025-02-08 PROCEDURE — 80053 COMPREHEN METABOLIC PANEL: CPT

## 2025-02-08 PROCEDURE — 80061 LIPID PANEL: CPT

## 2025-02-08 PROCEDURE — 85025 COMPLETE CBC W/AUTO DIFF WBC: CPT

## 2025-02-08 PROCEDURE — 36415 COLL VENOUS BLD VENIPUNCTURE: CPT

## 2025-02-08 PROCEDURE — G0103 PSA SCREENING: HCPCS

## 2025-02-10 ENCOUNTER — RESULTS FOLLOW-UP (OUTPATIENT)
Dept: FAMILY MEDICINE CLINIC | Facility: CLINIC | Age: 55
End: 2025-02-10

## 2025-03-10 ENCOUNTER — OFFICE VISIT (OUTPATIENT)
Dept: FAMILY MEDICINE CLINIC | Facility: CLINIC | Age: 55
End: 2025-03-10
Payer: COMMERCIAL

## 2025-03-10 VITALS
WEIGHT: 176.8 LBS | OXYGEN SATURATION: 95 % | HEART RATE: 64 BPM | DIASTOLIC BLOOD PRESSURE: 70 MMHG | HEIGHT: 63 IN | SYSTOLIC BLOOD PRESSURE: 110 MMHG | TEMPERATURE: 98.2 F | BODY MASS INDEX: 31.33 KG/M2

## 2025-03-10 DIAGNOSIS — R68.82 LOW LIBIDO: ICD-10-CM

## 2025-03-10 DIAGNOSIS — R51.9 PAIN OF SCALP: Primary | ICD-10-CM

## 2025-03-10 DIAGNOSIS — E78.2 ELEVATED TRIGLYCERIDES WITH HIGH CHOLESTEROL: ICD-10-CM

## 2025-03-10 DIAGNOSIS — Z00.00 ANNUAL PHYSICAL EXAM: ICD-10-CM

## 2025-03-10 PROCEDURE — 99396 PREV VISIT EST AGE 40-64: CPT | Performed by: FAMILY MEDICINE

## 2025-03-10 PROCEDURE — 99213 OFFICE O/P EST LOW 20 MIN: CPT | Performed by: FAMILY MEDICINE

## 2025-03-10 NOTE — PROGRESS NOTES
Adult Annual Physical  Name: Benito Catalan      : 1970      MRN: [id number]  Encounter Provider: Bailey Maddox MD  Encounter Date: 3/10/2025   Encounter department: [facility]    Assessment & Plan  Pain of scalp  Unclear etiology. No rash. ?herpes zoster though it is bilateral so unlikely. ?neuralgia Could try Valtrex PRN.  Patient will notify me if it recurs and he would like to try treatment. Symptoms only last 12 hours and occur approximately once every 3 months.       Low libido  Will check testosterone level  Orders:  •  Testosterone, free, total; Future    Annual physical exam         Elevated triglycerides with high cholesterol  Continue statin  Work on diet, exercise.        Immunizations and preventive care screenings were discussed with patient today. Appropriate education was printed on patient's after visit summary.        Counseling:  Exercise: the importance of regular exercise/physical activity was discussed. Recommend exercise 3-5 times per week for at least 30 minutes.   Immunizations UTD            History of Present Illness     Adult Annual Physical:  Patient presents for annual physical. 54 year old here for physical. Had labs done. Labs were normal other than elevated triglycerides.   His wife told him to mention that he seems more anxious lately. Patient is unsure about this, does not feel that he is anxious. He is on Trazodone for sleep. Sleeps well. He does notice a lower libido lately.  Denies any change in relationship but has had increased stress over the past year.     ERIC-7 Flowsheet Screening    Flowsheet Row Most Recent Value   Over the last two weeks, how often have you been bothered by the following   problems?     Feeling nervous, anxious, or on edge 1   Not being able to stop or control worrying 1   Worrying too much about different things 1   Trouble relaxing  0   Being so restless that it's hard to sit still 0   Becoming easily annoyed or  "irritable  0   Feeling afraid as if something awful might happen 0   How difficult have these problems made it for you to do your work, take   care of things at home, or get along with other people?  Somewhat   difficult   ERIC Score  3        Notes he occasionally has a sharp pain on his scalp and he thinks it might be shingles. It started after he had the shingles vaccine. It is usually on both sides of his head, lasts for 12 hours. There is no rash.  .     Diet and Physical Activity:  - Diet/Nutrition: poor diet.  - Exercise: no formal exercise. hiking on occasion    Depression Screening:  - PHQ-2 Score: 0    General Health:  - Sleep: sleeps well and 4-6 hours of sleep on average. light snoring  - Hearing: normal hearing bilateral ears.  - Vision: wears glasses and most recent eye exam > 1 year ago.  - Dental: regular dental visits and brushes teeth twice daily.     Health:    - Urinary symptoms: none.     -colonoscopy 2022    Review of Systems   Constitutional:  Negative for activity change, appetite change, fatigue and unexpected weight change.   Respiratory:  Negative for chest tightness and shortness of breath.    Cardiovascular:  Negative for chest pain and leg swelling.   Gastrointestinal:  Negative for abdominal pain.   Genitourinary:         Low libido   Neurological:  Negative for headaches.        Sharp pain on scalp intermittent.   Psychiatric/Behavioral:  Negative for dysphoric mood and sleep disturbance. The patient is not nervous/anxious.          Objective   /70   Pulse 64   Temp 98.2 °F (36.8 °C)   Ht 5' 3\" (1.6 m)   Wt 80.2 kg (176 lb 12.8 oz)   SpO2 95%   BMI 31.32 kg/m²     Physical Exam  Vitals and nursing note reviewed.   Constitutional:       General: He is not in acute distress.     Appearance: He is well-developed. He is not diaphoretic.   HENT:      Head: Normocephalic and atraumatic.      Right Ear: Tympanic membrane, ear canal and external ear normal.      Left Ear: " Tympanic membrane, ear canal and external ear normal.      Mouth/Throat:      Mouth: Mucous membranes are moist.      Pharynx: Oropharynx is clear.   Eyes:      Conjunctiva/sclera: Conjunctivae normal.   Cardiovascular:      Rate and Rhythm: Normal rate and regular rhythm.      Heart sounds: Normal heart sounds. No murmur heard.     No friction rub. No gallop.   Pulmonary:      Effort: Pulmonary effort is normal. No respiratory distress.      Breath sounds: Normal breath sounds. No stridor. No wheezing or rales.   Abdominal:      General: There is no distension.      Palpations: Abdomen is soft.      Tenderness: There is no abdominal tenderness.   Musculoskeletal:         General: No swelling.   Skin:     Findings: No rash.   Neurological:      General: No focal deficit present.      Mental Status: He is alert.   Psychiatric:         Mood and Affect: Mood normal.         Behavior: Behavior normal.         Thought Content: Thought content normal.         Judgment: Judgment normal.

## 2025-04-23 DIAGNOSIS — E78.00 HIGH CHOLESTEROL: ICD-10-CM

## 2025-04-23 RX ORDER — ATORVASTATIN CALCIUM 10 MG/1
10 TABLET, FILM COATED ORAL DAILY
Qty: 90 TABLET | Refills: 1 | Status: SHIPPED | OUTPATIENT
Start: 2025-04-23

## 2025-04-30 DIAGNOSIS — F41.9 ANXIETY: ICD-10-CM

## 2025-04-30 DIAGNOSIS — F51.01 PRIMARY INSOMNIA: ICD-10-CM

## 2025-04-30 RX ORDER — TRAZODONE HYDROCHLORIDE 100 MG/1
100 TABLET ORAL
Qty: 90 TABLET | Refills: 1 | Status: SHIPPED | OUTPATIENT
Start: 2025-04-30

## 2025-07-22 DIAGNOSIS — J30.9 ALLERGIC RHINITIS, UNSPECIFIED SEASONALITY, UNSPECIFIED TRIGGER: ICD-10-CM

## 2025-07-23 RX ORDER — FLUTICASONE PROPIONATE 50 MCG
1 SPRAY, SUSPENSION (ML) NASAL DAILY
Qty: 16 G | Refills: 2 | Status: SHIPPED | OUTPATIENT
Start: 2025-07-23

## 2025-07-24 NOTE — ASSESSMENT & PLAN NOTE
Per Dr. Savage: Hold zepbound for 10 days since last injection and then take it     Advised patient of Dr. Savage's note above. Patient verbalized understanding and states last dose taken was on 07/13/25, patient is more than 10 days since last injection. Patient to take 12.5mg dose today. No further questions at this time.   Taking the atorvastatin 10 mg and lipid panel ordered